# Patient Record
Sex: FEMALE | Race: WHITE | NOT HISPANIC OR LATINO | Employment: FULL TIME | URBAN - METROPOLITAN AREA
[De-identification: names, ages, dates, MRNs, and addresses within clinical notes are randomized per-mention and may not be internally consistent; named-entity substitution may affect disease eponyms.]

---

## 2021-11-05 DIAGNOSIS — M25.572 LEFT ANKLE PAIN, UNSPECIFIED CHRONICITY: Primary | ICD-10-CM

## 2021-11-09 ENCOUNTER — HOSPITAL ENCOUNTER (OUTPATIENT)
Dept: RADIOLOGY | Facility: HOSPITAL | Age: 36
Discharge: HOME | End: 2021-11-09
Attending: ORTHOPAEDIC SURGERY
Payer: COMMERCIAL

## 2021-11-09 ENCOUNTER — OFFICE VISIT (OUTPATIENT)
Dept: ORTHOPEDICS | Facility: CLINIC | Age: 36
End: 2021-11-09
Payer: COMMERCIAL

## 2021-11-09 DIAGNOSIS — M21.6X2 ACQUIRED CAVOVARUS DEFORMITY OF FOOT, LEFT: ICD-10-CM

## 2021-11-09 DIAGNOSIS — M25.572 LEFT ANKLE PAIN, UNSPECIFIED CHRONICITY: ICD-10-CM

## 2021-11-09 DIAGNOSIS — G60.0 CHARCOT-MARIE-TOOTH DISEASE: ICD-10-CM

## 2021-11-09 DIAGNOSIS — G60.0 CHARCOT-MARIE-TOOTH DISEASE: Primary | ICD-10-CM

## 2021-11-09 PROCEDURE — 73630 XR FOOT COMPLETE 3 VIEW LEFT: ICD-10-PCS | Mod: 26,LT,, | Performed by: RADIOLOGY

## 2021-11-09 PROCEDURE — 73630 X-RAY EXAM OF FOOT: CPT | Mod: 26,LT,, | Performed by: RADIOLOGY

## 2021-11-09 PROCEDURE — 73630 X-RAY EXAM OF FOOT: CPT | Mod: TC,PO,LT

## 2021-11-09 PROCEDURE — 99204 PR OFFICE/OUTPT VISIT, NEW, LEVL IV, 45-59 MIN: ICD-10-PCS | Mod: S$GLB,,, | Performed by: ORTHOPAEDIC SURGERY

## 2021-11-09 PROCEDURE — 99999 PR PBB SHADOW E&M-EST. PATIENT-LVL II: ICD-10-PCS | Mod: PBBFAC,,, | Performed by: ORTHOPAEDIC SURGERY

## 2021-11-09 PROCEDURE — 99999 PR PBB SHADOW E&M-EST. PATIENT-LVL II: CPT | Mod: PBBFAC,,, | Performed by: ORTHOPAEDIC SURGERY

## 2021-11-09 PROCEDURE — 73610 XR ANKLE COMPLETE 3 VIEW LEFT: ICD-10-PCS | Mod: 26,LT,, | Performed by: RADIOLOGY

## 2021-11-09 PROCEDURE — 73610 X-RAY EXAM OF ANKLE: CPT | Mod: TC,PO,LT

## 2021-11-09 PROCEDURE — 73610 X-RAY EXAM OF ANKLE: CPT | Mod: 26,LT,, | Performed by: RADIOLOGY

## 2021-11-09 PROCEDURE — 99204 OFFICE O/P NEW MOD 45 MIN: CPT | Mod: S$GLB,,, | Performed by: ORTHOPAEDIC SURGERY

## 2021-11-09 RX ORDER — ALPRAZOLAM 1 MG/1
TABLET ORAL
COMMUNITY
End: 2022-04-29

## 2021-11-09 RX ORDER — METHYLPREDNISOLONE 4 MG/1
TABLET ORAL
COMMUNITY
End: 2022-04-29

## 2021-11-09 RX ORDER — ZOLPIDEM TARTRATE 10 MG/1
TABLET ORAL
COMMUNITY

## 2021-11-09 RX ORDER — AMITRIPTYLINE HYDROCHLORIDE 100 MG/1
TABLET ORAL
COMMUNITY
End: 2022-04-29

## 2021-11-09 RX ORDER — ALBUTEROL SULFATE 90 UG/1
AEROSOL, METERED RESPIRATORY (INHALATION)
COMMUNITY
End: 2022-04-29

## 2021-11-09 RX ORDER — ATOMOXETINE 40 MG/1
CAPSULE ORAL
COMMUNITY
End: 2022-04-29

## 2021-11-09 RX ORDER — AMOXICILLIN 875 MG/1
875 TABLET, FILM COATED ORAL 2 TIMES DAILY
COMMUNITY
Start: 2021-09-22 | End: 2022-04-29

## 2021-11-09 RX ORDER — AMITRIPTYLINE HYDROCHLORIDE 50 MG/1
TABLET, FILM COATED ORAL
COMMUNITY
End: 2022-04-29

## 2021-11-09 RX ORDER — AZITHROMYCIN 250 MG/1
TABLET, FILM COATED ORAL
COMMUNITY
End: 2022-04-29

## 2021-11-09 RX ORDER — AMITRIPTYLINE HYDROCHLORIDE 25 MG/1
TABLET, FILM COATED ORAL
COMMUNITY
End: 2022-04-29

## 2021-11-09 RX ORDER — TRAZODONE HYDROCHLORIDE 100 MG/1
TABLET ORAL
COMMUNITY

## 2021-11-09 RX ORDER — AZITHROMYCIN 500 MG/1
TABLET, FILM COATED ORAL
COMMUNITY
End: 2022-04-29

## 2021-12-09 DIAGNOSIS — G60.0 CHARCOT-MARIE-TOOTH DISEASE: Primary | ICD-10-CM

## 2021-12-09 DIAGNOSIS — M21.6X2 ACQUIRED CAVOVARUS DEFORMITY OF FOOT, LEFT: ICD-10-CM

## 2021-12-10 ENCOUNTER — PATIENT MESSAGE (OUTPATIENT)
Dept: ORTHOPEDICS | Facility: CLINIC | Age: 36
End: 2021-12-10
Payer: COMMERCIAL

## 2022-01-11 ENCOUNTER — PATIENT MESSAGE (OUTPATIENT)
Dept: ORTHOPEDICS | Facility: CLINIC | Age: 37
End: 2022-01-11
Payer: COMMERCIAL

## 2022-04-29 ENCOUNTER — OFFICE VISIT (OUTPATIENT)
Dept: ORTHOPEDICS | Facility: CLINIC | Age: 37
End: 2022-04-29
Payer: COMMERCIAL

## 2022-04-29 DIAGNOSIS — M21.6X2 ACQUIRED CAVOVARUS DEFORMITY OF FOOT, LEFT: ICD-10-CM

## 2022-04-29 DIAGNOSIS — G60.0 CHARCOT-MARIE-TOOTH DISEASE: Primary | ICD-10-CM

## 2022-04-29 DIAGNOSIS — M21.6X1 ACQUIRED CAVOVARUS DEFORMITY OF RIGHT FOOT: ICD-10-CM

## 2022-04-29 DIAGNOSIS — M67.02 CONTRACTURE OF LEFT ACHILLES TENDON: ICD-10-CM

## 2022-04-29 PROCEDURE — 20605 DRAIN/INJ JOINT/BURSA W/O US: CPT | Mod: LT,S$GLB,, | Performed by: ORTHOPAEDIC SURGERY

## 2022-04-29 PROCEDURE — 99214 OFFICE O/P EST MOD 30 MIN: CPT | Mod: 25,S$GLB,, | Performed by: ORTHOPAEDIC SURGERY

## 2022-04-29 PROCEDURE — 99999 PR PBB SHADOW E&M-EST. PATIENT-LVL III: CPT | Mod: PBBFAC,,, | Performed by: ORTHOPAEDIC SURGERY

## 2022-04-29 PROCEDURE — 99214 PR OFFICE/OUTPT VISIT, EST, LEVL IV, 30-39 MIN: ICD-10-PCS | Mod: 25,S$GLB,, | Performed by: ORTHOPAEDIC SURGERY

## 2022-04-29 PROCEDURE — 1159F MED LIST DOCD IN RCRD: CPT | Mod: CPTII,S$GLB,, | Performed by: ORTHOPAEDIC SURGERY

## 2022-04-29 PROCEDURE — 1159F PR MEDICATION LIST DOCUMENTED IN MEDICAL RECORD: ICD-10-PCS | Mod: CPTII,S$GLB,, | Performed by: ORTHOPAEDIC SURGERY

## 2022-04-29 PROCEDURE — 99999 PR PBB SHADOW E&M-EST. PATIENT-LVL III: ICD-10-PCS | Mod: PBBFAC,,, | Performed by: ORTHOPAEDIC SURGERY

## 2022-04-29 PROCEDURE — 20605 INTERMEDIATE JOINT ASPIRATION/INJECTION: L ANKLE: ICD-10-PCS | Mod: LT,S$GLB,, | Performed by: ORTHOPAEDIC SURGERY

## 2022-04-29 RX ORDER — DIAZEPAM 2 MG/1
TABLET ORAL
COMMUNITY
Start: 2021-07-22

## 2022-04-29 RX ORDER — HYDROXYZINE HYDROCHLORIDE 10 MG/1
10 TABLET, FILM COATED ORAL 2 TIMES DAILY
COMMUNITY
Start: 2021-11-05 | End: 2022-12-06

## 2022-04-29 RX ORDER — ESCITALOPRAM OXALATE 20 MG/1
20 TABLET ORAL DAILY
COMMUNITY
Start: 2022-02-07

## 2022-04-29 RX ORDER — PROGESTERONE 200 MG/1
CAPSULE ORAL
COMMUNITY
Start: 2022-02-11

## 2022-04-29 RX ORDER — DIAZEPAM 5 MG/1
TABLET ORAL
COMMUNITY
End: 2022-12-06

## 2022-04-29 RX ORDER — CLONAZEPAM 0.5 MG/1
TABLET ORAL
COMMUNITY
End: 2022-12-06

## 2022-04-29 RX ORDER — PHENYLEPHRINE HCL 10 MG
TABLET ORAL
COMMUNITY
End: 2022-12-06

## 2022-04-29 RX ORDER — FLUCONAZOLE 150 MG/1
150 TABLET ORAL
COMMUNITY
Start: 2022-04-07 | End: 2022-12-06

## 2022-04-29 RX ORDER — DAPAGLIFLOZIN AND METFORMIN HYDROCHLORIDE 5; 500 MG/1; MG/1
TABLET, FILM COATED, EXTENDED RELEASE ORAL
COMMUNITY
End: 2022-12-06

## 2022-04-29 RX ORDER — IBUPROFEN 800 MG/1
TABLET ORAL
COMMUNITY

## 2022-04-29 RX ORDER — DULOXETIN HYDROCHLORIDE 20 MG/1
20 CAPSULE, DELAYED RELEASE ORAL DAILY
COMMUNITY
Start: 2022-04-25 | End: 2022-12-06

## 2022-04-29 RX ORDER — SITAGLIPTIN 100 MG/1
100 TABLET, FILM COATED ORAL DAILY
COMMUNITY
Start: 2022-04-19 | End: 2022-12-06

## 2022-04-29 RX ADMIN — TRIAMCINOLONE ACETONIDE 40 MG: 40 INJECTION, SUSPENSION INTRA-ARTICULAR; INTRAMUSCULAR at 11:04

## 2022-04-29 NOTE — PROGRESS NOTES
Subjective:   Chief complaint: Follow-up bilat foot.    HPI:   Kayla Aguilar is a 36 y.o. female who presents today for follow-up.  Pain is 6/10.  Left >>> right.  2 reasons for visit today - 1.) left ankle pain; wonders about injection and 2.) discuss different brace options    She cancelled surgery due to plans to get pregnant.      ROS:  Musculoskeletal: per HPI     Objective:   Exam:  There were no vitals filed for this visit.  General: No acute distress, well-appearing  Musculoskeletal: Standing examination demonstrates plantigrade and full correction on the right and slight residual equinovarus on left but foot still able to contact ground.  Left achilles contracture present.    Imaging:  None    Additional testing/results reviewed:  None      Assessment:     1. Charcot-Chary-Tooth disease    2. Acquired cavovarus deformity of foot, left    3. Acquired cavovarus deformity of right foot    4. Contracture of left Achilles tendon        Patient is seen for multiple problems (not at treatment goal) with treatment complicated by charcot chary tooth disease; neurologic component of this.    Data: none    Treatment plan: Corticosteroid injection management discussed and provided    I again reviewed imaging, clinical history, and diagnosis as above with the patient. I attempted to use layman's terms to educate the patient as well as utilize foot models and/or pictures.   At this point, attempted non-operative treatment is providing only partial relief.  The next step is 1.) new braces and 2.) CSI.      Plan:       1.  Therapy: None but emphasized importance of maintaining THEODORE flexibility  2.  Symptomatic treatment: left ankle CSI provided today  3.  Restrictions: Advance activity as tolerated, use pain as guide  4.  Brace/orthotics/etc: Solid ankle AFOs, consideration for posterior vs medial strut (left specifically) only  5.  Follow-up: 2 weeks with phone f/up to discuss response to CSI        No orders of the  defined types were placed in this encounter.      Past Medical History:   Diagnosis Date    CMT (cervical motion tenderness)     Diabetes mellitus, type 2        Past Surgical History:   Procedure Laterality Date    ANKLE SURGERY Left     OVARY SURGERY      TONSILLECTOMY         History reviewed. No pertinent family history.    Social History     Socioeconomic History    Marital status:

## 2022-04-29 NOTE — LETTER
[facility] - Orthopedics  [address]  [facility] [address]  Phone: [phone]  Fax: [phone]       Kayla Aguilar  1985  [address] [place] [address]  [place] [address]  [phone]     04/29/2022    [facility], [address] [address]; [phone] (phone) and [phone] (fax),    I am referring you my patient Kayla Aguilar for evaluation and fitting of Off-the-shelf - molded equipment as detailed below.    Diagnosis:     ICD-10-CM ICD-9-CM   1. Charcot-Chary-Tooth disease  G60.0 356.1   2. Acquired cavovarus deformity of foot, left  M21.6X2 736.75   3. Acquired cavovarus deformity of right foot  M21.6X1 736.75        Rx: Carbon fiber AFOs with left medial side post  Orthotist discretion: Yes    Sincerely,      Steff Buck MD  Foot & Ankle Orthopedic Surgery  [phone]

## 2022-05-01 RX ORDER — TRIAMCINOLONE ACETONIDE 40 MG/ML
40 INJECTION, SUSPENSION INTRA-ARTICULAR; INTRAMUSCULAR
Status: DISCONTINUED | OUTPATIENT
Start: 2022-04-29 | End: 2022-05-01 | Stop reason: HOSPADM

## 2022-05-01 NOTE — PROCEDURES
Intermediate Joint Aspiration/Injection: L ankle    Date/Time: 4/29/2022 11:15 AM  Performed by: Steff Buck MD  Authorized by: Steff Buck MD     Consent Done?:  Yes (Verbal)  Indications:  Pain  Site marked: The procedure site was marked    Timeout: Prior to procedure the correct patient, procedure, and site was verified      Location:  Ankle  Site:  L ankle  Needle size:  22 G  Approach:  Anteromedial  Medications:  40 mg triamcinolone acetonide 40 mg/mL  Patient tolerance:  Patient tolerated the procedure well with no immediate complications

## 2022-05-04 ENCOUNTER — PATIENT MESSAGE (OUTPATIENT)
Dept: ORTHOPEDICS | Facility: CLINIC | Age: 37
End: 2022-05-04
Payer: COMMERCIAL

## 2022-05-17 ENCOUNTER — TELEPHONE (OUTPATIENT)
Dept: ORTHOPEDICS | Facility: CLINIC | Age: 37
End: 2022-05-17
Payer: COMMERCIAL

## 2022-05-17 NOTE — TELEPHONE ENCOUNTER
M for pt to give me a call back.----- Message from Des Bueno MA sent at 5/16/2022  2:09 PM CDT -----  Regarding: FW: Injection response?    ----- Message -----  From: Steff Buck MD  Sent: 5/15/2022  12:00 AM CDT  To: [facility] Staff  Subject: Injection response?                              Please call patient to check in on response/relief from injection.    Thanks!    Beronica

## 2022-05-18 ENCOUNTER — PATIENT MESSAGE (OUTPATIENT)
Dept: ORTHOPEDICS | Facility: CLINIC | Age: 37
End: 2022-05-18
Payer: COMMERCIAL

## 2022-08-09 ENCOUNTER — PATIENT MESSAGE (OUTPATIENT)
Dept: ORTHOPEDICS | Facility: CLINIC | Age: 37
End: 2022-08-09
Payer: COMMERCIAL

## 2022-10-03 DIAGNOSIS — M21.6X2 ACQUIRED CAVOVARUS DEFORMITY OF FOOT, LEFT: Primary | ICD-10-CM

## 2022-11-29 ENCOUNTER — PATIENT MESSAGE (OUTPATIENT)
Dept: ORTHOPEDICS | Facility: CLINIC | Age: 37
End: 2022-11-29
Payer: COMMERCIAL

## 2022-12-02 DIAGNOSIS — M21.6X2 ACQUIRED CAVOVARUS DEFORMITY OF FOOT, LEFT: Primary | ICD-10-CM

## 2022-12-05 ENCOUNTER — TELEPHONE (OUTPATIENT)
Dept: ORTHOPEDICS | Facility: CLINIC | Age: 37
End: 2022-12-05
Payer: COMMERCIAL

## 2022-12-05 NOTE — TELEPHONE ENCOUNTER
Spoke to pt and informed her on my end I dont see a co pay----- Message from Rachell Arcos sent at 12/5/2022 10:51 AM CST -----  Contact: pt  Pt calling to see if she can get more time to pay off her co pay , pt has appt on  tomorrow     Confirmed patient's contact info below:  Contact Name: Kayla Aguilar  Phone Number: [phone]

## 2022-12-06 ENCOUNTER — HOSPITAL ENCOUNTER (OUTPATIENT)
Dept: RADIOLOGY | Facility: HOSPITAL | Age: 37
Discharge: HOME | End: 2022-12-06
Attending: ORTHOPAEDIC SURGERY
Payer: COMMERCIAL

## 2022-12-06 ENCOUNTER — OFFICE VISIT (OUTPATIENT)
Dept: ORTHOPEDICS | Facility: CLINIC | Age: 37
End: 2022-12-06
Payer: COMMERCIAL

## 2022-12-06 DIAGNOSIS — G60.0 CHARCOT-MARIE-TOOTH DISEASE: ICD-10-CM

## 2022-12-06 DIAGNOSIS — M21.6X2 ACQUIRED CAVOVARUS DEFORMITY OF FOOT, LEFT: Primary | ICD-10-CM

## 2022-12-06 DIAGNOSIS — M21.6X2 ACQUIRED CAVOVARUS DEFORMITY OF FOOT, LEFT: ICD-10-CM

## 2022-12-06 PROCEDURE — 20605 DRAIN/INJ JOINT/BURSA W/O US: CPT | Mod: LT,S$GLB,, | Performed by: ORTHOPAEDIC SURGERY

## 2022-12-06 PROCEDURE — 99999 PR PBB SHADOW E&M-EST. PATIENT-LVL III: ICD-10-PCS | Mod: PBBFAC,,, | Performed by: ORTHOPAEDIC SURGERY

## 2022-12-06 PROCEDURE — 73630 X-RAY EXAM OF FOOT: CPT | Mod: 26,LT,, | Performed by: RADIOLOGY

## 2022-12-06 PROCEDURE — 20605 INTERMEDIATE JOINT ASPIRATION/INJECTION: L ANKLE: ICD-10-PCS | Mod: LT,S$GLB,, | Performed by: ORTHOPAEDIC SURGERY

## 2022-12-06 PROCEDURE — 99214 PR OFFICE/OUTPT VISIT, EST, LEVL IV, 30-39 MIN: ICD-10-PCS | Mod: 25,S$GLB,, | Performed by: ORTHOPAEDIC SURGERY

## 2022-12-06 PROCEDURE — 99999 PR PBB SHADOW E&M-EST. PATIENT-LVL III: CPT | Mod: PBBFAC,,, | Performed by: ORTHOPAEDIC SURGERY

## 2022-12-06 PROCEDURE — 73630 XR FOOT COMPLETE 3 VIEW LEFT: ICD-10-PCS | Mod: 26,LT,, | Performed by: RADIOLOGY

## 2022-12-06 PROCEDURE — 1159F PR MEDICATION LIST DOCUMENTED IN MEDICAL RECORD: ICD-10-PCS | Mod: CPTII,S$GLB,, | Performed by: ORTHOPAEDIC SURGERY

## 2022-12-06 PROCEDURE — 73630 X-RAY EXAM OF FOOT: CPT | Mod: TC,PO,LT

## 2022-12-06 PROCEDURE — 1159F MED LIST DOCD IN RCRD: CPT | Mod: CPTII,S$GLB,, | Performed by: ORTHOPAEDIC SURGERY

## 2022-12-06 PROCEDURE — 99214 OFFICE O/P EST MOD 30 MIN: CPT | Mod: 25,S$GLB,, | Performed by: ORTHOPAEDIC SURGERY

## 2022-12-06 RX ADMIN — TRIAMCINOLONE ACETONIDE 40 MG: 40 INJECTION, SUSPENSION INTRA-ARTICULAR; INTRAMUSCULAR at 01:12

## 2022-12-06 NOTE — PROCEDURES
Intermediate Joint Aspiration/Injection: L ankle    Date/Time: 12/6/2022 1:30 PM  Performed by: Steff Buck MD  Authorized by: Steff Buck MD     Consent Done?:  Yes (Verbal)  Indications:  Pain  Site marked: The procedure site was marked    Timeout: Prior to procedure the correct patient, procedure, and site was verified      Location:  Ankle  Site:  L ankle  Needle size:  22 G  Approach:  Anteromedial  Medications:  40 mg triamcinolone acetonide 40 mg/mL  Patient tolerance:  Patient tolerated the procedure well with no immediate complications

## 2022-12-06 NOTE — PROGRESS NOTES
Subjective:   Chief complaint: Follow-up left foot.    HPI:   Kayla Aguilar is a 36 y.o. female who presents today for follow-up.  Pain is 5/10.  Braces continue to work well on right but less well on left.  Increased lateral foot callus since last visit but noted good relief from CSI.  Presents for repeat CSI.  They still are working on plans to start family so goal remains to avoid surgery.     ROS:  Musculoskeletal: per HPI     Objective:   Exam:  There were no vitals filed for this visit.  General: No acute distress, well-appearing  Musculoskeletal: Left foot skin with callus without hemorrhage or breakdown.  Semi-rigid hindfoot varus with metatarsus adductus and equinus.    Imaging:  None    Additional testing/results reviewed:  Previous treatment- AFO braces, ankle CSI.            Assessment:     1. Acquired cavovarus deformity of foot, left    2. Charcot-Chary-Tooth disease         Patient is seen for a chronic problem with acute worsening with treatment complicated by underlying progressive neurologic condition (CMT) .    Data: none    Treatment plan: Corticosteroid injection management discussed and provided (see procedure notes)        Plan:       1.  Therapy: None  2.  Symptomatic treatment: No changes made  --CSI provided today (see separate note), can be repeated every 3-4 months  3.  Restrictions: Advance activity as tolerated, use pain as guide  4.  Brace/orthotics/etc: continue AFOs  5.  Follow-up: Followup via phone in 2 weeks to discuss response to CSI       Orders Placed This Encounter   Procedures    Intermediate Joint Aspiration/Injection: L ankle     This order was created via procedure documentation       Past Medical History:   Diagnosis Date    CMT (cervical motion tenderness)     Diabetes mellitus, type 2        Past Surgical History:   Procedure Laterality Date    ANKLE SURGERY Left     OVARY SURGERY      TONSILLECTOMY         No family history on file.    Social History      Socioeconomic History    Marital status:    Tobacco Use    Smoking status: Never    Smokeless tobacco: Never

## 2022-12-06 NOTE — PATIENT INSTRUCTIONS
You were provided a corticosteroid and anesthetic (numbing medicine) injection today. Injections are provided for both pain relief as well as to help further identify and treat causes of your pain.  I expect that the numbing medicine will last for several hours.  Once the numbing medicine wears off, do not be alarmed if you have some increased pain from the injection and the fluid in the joint. You can treat this with over the counter anti-inflammatories or acetaminophen.  It can take 3-5 days for the corticosteroid to reach maximal effect.  The duration of relief from an injection it is different for every patient. Injections can only be repeated up to 3-4 times per year however.    It is normal for some pain following injection, but call our office (or seek out evaluation with provider- ER, urgent care, etc) if you have any of the following concerning findings:  Redness and swelling at the injection site  Drainage from the injection site  Fever >100.4 associated with increased pain or the above at the injection site      You were also seen for left foot callus, to hep mitigate/treat these calluses below are options:  eucerin cream - to help keep the area moisturized   salicylic acid callus helps treat the callus once it is formed

## 2022-12-11 RX ORDER — TRIAMCINOLONE ACETONIDE 40 MG/ML
40 INJECTION, SUSPENSION INTRA-ARTICULAR; INTRAMUSCULAR
Status: DISCONTINUED | OUTPATIENT
Start: 2022-12-06 | End: 2022-12-11 | Stop reason: HOSPADM

## 2023-02-03 ENCOUNTER — PATIENT MESSAGE (OUTPATIENT)
Dept: ORTHOPEDICS | Facility: CLINIC | Age: 38
End: 2023-02-03
Payer: COMMERCIAL

## 2023-05-03 ENCOUNTER — PATIENT MESSAGE (OUTPATIENT)
Dept: ORTHOPEDICS | Facility: CLINIC | Age: 38
End: 2023-05-03
Payer: COMMERCIAL

## 2024-05-14 ENCOUNTER — OFFICE VISIT (OUTPATIENT)
Dept: MATERNAL FETAL MEDICINE | Facility: CLINIC | Age: 39
End: 2024-05-14
Payer: COMMERCIAL

## 2024-05-14 VITALS
BODY MASS INDEX: 39.27 KG/M2 | SYSTOLIC BLOOD PRESSURE: 137 MMHG | HEIGHT: 64 IN | HEART RATE: 93 BPM | WEIGHT: 230 LBS | DIASTOLIC BLOOD PRESSURE: 87 MMHG

## 2024-05-14 DIAGNOSIS — F41.9 ANXIETY: ICD-10-CM

## 2024-05-14 DIAGNOSIS — E03.9 HYPOTHYROIDISM, UNSPECIFIED TYPE: Primary | ICD-10-CM

## 2024-05-14 DIAGNOSIS — E66.9 OBESITY (BMI 30-39.9): ICD-10-CM

## 2024-05-14 DIAGNOSIS — E11.9 TYPE 2 DIABETES MELLITUS WITHOUT COMPLICATION, UNSPECIFIED WHETHER LONG TERM INSULIN USE: ICD-10-CM

## 2024-05-14 DIAGNOSIS — G60.0 CHARCOT MARIE TOOTH MUSCULAR ATROPHY: ICD-10-CM

## 2024-05-14 DIAGNOSIS — R79.89 LOW SERUM CORTISOL LEVEL: ICD-10-CM

## 2024-05-14 DIAGNOSIS — Z31.69 ENCOUNTER FOR PRECONCEPTION CONSULTATION: ICD-10-CM

## 2024-05-14 PROCEDURE — 99205 OFFICE O/P NEW HI 60 MIN: CPT | Mod: S$GLB,,, | Performed by: OBSTETRICS & GYNECOLOGY

## 2024-05-14 RX ORDER — PROMETHAZINE HYDROCHLORIDE 25 MG/1
TABLET ORAL EVERY 6 HOURS PRN
COMMUNITY
Start: 2024-05-13

## 2024-05-14 RX ORDER — LEVOTHYROXINE SODIUM 50 UG/1
25 TABLET ORAL
COMMUNITY

## 2024-05-14 RX ORDER — ELUXADOLINE 75 MG/1
1 TABLET, FILM COATED ORAL 2 TIMES DAILY
COMMUNITY
Start: 2024-05-10

## 2024-05-14 RX ORDER — FLUTICASONE PROPIONATE 50 MCG
2 SPRAY, SUSPENSION (ML) NASAL 2 TIMES DAILY PRN
COMMUNITY
Start: 2023-12-18

## 2024-05-14 RX ORDER — HUMAN INSULIN 100 [IU]/ML
INJECTION, SUSPENSION SUBCUTANEOUS
COMMUNITY
End: 2024-05-17 | Stop reason: ALTCHOICE

## 2024-05-14 RX ORDER — METFORMIN HYDROCHLORIDE EXTENDED-RELEASE TABLETS 1000 MG/1
500 TABLET, FILM COATED, EXTENDED RELEASE ORAL 2 TIMES DAILY WITH MEALS
COMMUNITY

## 2024-05-14 RX ORDER — ESCITALOPRAM OXALATE 10 MG/1
5 TABLET ORAL DAILY
COMMUNITY

## 2024-05-14 RX ORDER — INSULIN ASPART 100 [IU]/ML
INJECTION, SOLUTION INTRAVENOUS; SUBCUTANEOUS
COMMUNITY
End: 2024-05-16 | Stop reason: SDUPTHER

## 2024-05-15 NOTE — PROGRESS NOTES
ENDOCRINOLOGY NEW PATIENT VISIT: 05/16/2024    The patient location is: Home in [place]  The chief complaint leading to consultation is: Diabetes and Thyroid    Visit type: audiovisual    Face to Face time with patient: 30  50 minutes of total time spent on the encounter, which includes face to face time and non-face to face time preparing to see the patient (eg, review of tests), Obtaining and/or reviewing separately obtained history, Documenting clinical information in the electronic or other health record, Independently interpreting results (not separately reported) and communicating results to the patient/family/caregiver, or Care coordination (not separately reported).     Each patient to whom he or she provides medical services by telemedicine is:  (1) informed of the relationship between the physician and patient and the respective role of any other health care provider with respect to management of the patient; and (2) notified that he or she may decline to receive medical services by telemedicine and may withdraw from such care at any time.      Subjective:      Patient ID: Kayal Aguilar is a 38 y.o. female.    Chief Complaint:  Diabetes and Thyroid    History of Present Illness  Kayla Aguilar has a medical history of type 2 diabetes, Charcot foot, and current plans for pregnancy.  She is following with a fertility clinic and also maternal fetal medicine given her age and plans for pregnancy with underlying diabetes.      Referred by reproductive endocrinology (Dr. Antione Childs) due to plans for attempts at pregnancy and a history of diabetes.  Had seen endocrinologist in [place] in January but has not liked them and their follow up on labs and such.  She is now establishing with [facility].  She also recently changed her [facility] physician to [facility] as well.       Fertility plans.  Had plans for pregnancy attempts backed off so she could have her recent foot surgery completed (charcot florencia tooth).   She remains in a cast at this time.  Plans coming up for IUI cycle.  Likely in the next 1-2 months she estimates.      11/27/2023 Labs (in media)  AMH:  1.31  A1c: 7.2 %  (also more recent one in pat 1-2 months of 7.1%)  Est Avg Glucose: 161  Prolactin: 10.4  TSH: 4.510  FSH: 8.12    Following the above labs she had thyroid hormone started in December.  Has been on Levothyroxine 25 mcg since without a repeat TSH.  She had some labs checked with her T3 level being low she recalls.     Normal dexamethasone suppression test results performed with ACTH <5 and Cortisol of 0.97 (read results to me off her phone)      Regarding diabetes:    Initially Diagnosed with T2D:  2015    Current symptoms/problems:     Some symptoms of neuropathy (also with Charcot foot hx).  Concerns have been for IBS for which she has been following with GI and recently placed on Viberzi (helps her)  BM from 7 or more to 2-3 times a day. All GI symptoms present since being on metformin over the years without a trial of stopping therapy.      Known diabetic complications: peripheral neuropathy  Cardiovascular risk factors: diabetes mellitus, obesity (BMI >= 30 kg/m2), and sedentary lifestyle    Current diabetic medications include:    1.   Novolog 5-15 units (occasionally 20 units)  Based on meal and carb content.     2.   Metformin 500 mg BID (sometimes only taking once a day if sugar is lower)   3.   Novolin (NPH)  Gives 10 units of this when she wakes up and after lunch (twice a day mostly).      Other medications tried:  Lantus (told not pregnancy safe by outside MFM and fertility physician)  Ozempic in the past (off due to GI side effects and plans for pregnancy)  Trulicity   Xigduo (Metformin and dapagliflozin combo)      Diet: on average, 2 meals per day    Exercise:  VR workouts , mobility limited due to being in a cast.     Glucose Trends:  Monitors 4-5 times a day with finger sticks.  Attempts at CGM in past unsuccessful for coverage       "         Average AM fasting -  110-130's       Average Post Lunch -  120-140's        Average Post Dinner -          Any episodes of hypoglycemia? yes - intermittent, but not often.  Had recent drop to 67.      Family Hx:  Denies FH of diabetes or thyroid disorder             Wt Readings from Last 10 Encounters:   05/14/24 104.3 kg (230 lb)       Diabetes Management Status    Statin: Not taking  ACE/ARB: Not taking      Screening or Prevention Patient's value   HgA1C Testing and Control No results found for: "HGBA1C"   Lipid profile  Most Recent Lipid Panel Health Maintenance Topic Completion: Not Found   LDL control (goal LDL <70) No results found for: "LDLCALC"   Nephropathy screening No results found for: "LABMICR"  Lab Results   Component Value Date    PROTEINUA Negative 08/11/2022      Blood pressure BP Readings from Last 3 Encounters:   05/14/24 137/87      Dilated retinal exam Most Recent Eye Exam Date: Not Found   Foot exam Most Recent Foot Exam Date: Not Found        Regarding Hypothyroidism:    Had TSH levels at 4.5 (no prior issues with thyroid function)    Started on LT4 in setting of planned pregnancy and TSH above 2.5    - Current relevant medications: levothyroxine T4 (Synthroid) 25 mcg daily    - Takes thyroid medications properly  - Plans for pregnancy at this time: Yes, working with fertility clinic.    No results found for: "TSH", "FREET4", "TOTALT3", "THYROIDSTIMI", "THYROPEROXID", "NA", "GLU"  There were no vitals filed for this visit.     ROS:   As above    Objective:     Ht 5' 4" (1.626 m)   LMP 04/16/2024 (Exact Date)   BMI 39.48 kg/m²   BP Readings from Last 3 Encounters:   05/14/24 137/87     Wt Readings from Last 1 Encounters:   05/14/24 1321 104.3 kg (230 lb)     Body mass index is 39.48 kg/m².    Physical Exam  Constitutional:       General: She is not in acute distress.     Appearance: Normal appearance.   Pulmonary:      Effort: Pulmonary effort is normal.   Neurological:      " "Mental Status: She is alert.   Psychiatric:         Mood and Affect: Mood normal.         Behavior: Behavior normal.        Lab Review:   No results found for: "HGBA1C"  No results found for: "CHOL", "HDL", "LDLCALC", "TRIG", "CHOLHDL"  No results found for: "NA", "K", "CL", "CO2", "GLU", "BUN", "CREATININE", "CALCIUM", "PROT", "ALBUMIN", "BILITOT", "ALKPHOS", "AST", "ALT", "ANIONGAP", "ESTGFRAFRICA", "EGFRNONAA", "TSH"  No results found for: "ZHILZCFX75MG"    Assessment and Plan     Low serum cortisol level  Reviewed recent labs that were completed outside of [facility].  These included a dexamethasone suppression test which showed her 8 am morning cortisol and ACTH to both be low.  Explained that this is a normal result and is meant to screen for hypercortisolism.  Cutoff to rule out an issue was met with cortisol of 0.97 (cutoff of less than 1.8).      Requested she takes pictures or scans of outside labs and send to us for completeness so they can be reviewed.  At the moment no concerns for hypercortisolism and no symptoms to suggest adrenal insuffiencey, simply a normal lab response to the DST causing the cortisol levels to be low.      Hypothyroidism  Relative hypothyroidism with recent outside labs showing TSH values just above 4.5.  In setting of planned pregnancy she was placed on Levothyroxine 25 mcg once daily approx 5 months ago.  Due for repeat TSH at this time, labs to be done this week.      Will adjust dose as needed with goal for TSH to remain below 2.5    Type 2 diabetes mellitus without complication  Reviewed oral recall of blood glucose levels from finger sticks.  Morning fasting glucose levels have been above target and postprandial glucose levels have been elevated at times as well.  Not using CGM.  Recent changes to her home medicines with discontinuation of Lantus and NPH ordered in it's place.  Had been on GLP-1 RA in past but now given pregnancy plans off this and using Novolog with meals.  " Metformin at lower dose continues for now.      Plan:  - Will clarify with maternal fetal medicine if they have concerns about Lantus (insulin glargine) use - can potentially simplify regimen if not  - Continue NPH insulin, 10 units twice daily  - Continue Novolog 5-15 units based on current sliding regimen with meals (will need 8 units with meals plus sliding scale once CGM approved)  - Continue Metformin 500 mg BID (can consider stopping if GI feels bowel issues are related to this therapy)    - Will order Dexcom G7 sensors (confirmed phone is compatible - iPhone)   - Will give instructions for downloading Dexcom and Clarity apps and sharing info with clinic remotely  - Continue use of glucometer with at least morning fasting BG and 1-2 hour post-prandial BG values (4 x daily checks)  - Healthy diabetic diet and physical activity/exercise as tolerated given recent surgery and need for a cast  - If she becomes pregnant we will need even closer monitoring, for now plan 3-4 week virtual follow up to check on current insulin doses    Pregnancy BG goals  Fasting: <95  1 hour post prandial: <140  2 hour post prandial: <120      Return to clinic virtually 3-4 weeks for BG check and adjustments to regimen as needed.  Labs soon (A1c and TSH)      **Visit today included increased complexity associated with the care of the problems addressed and managing the longitudinal care of the patient due to the serious and/or complex managed problems**      Alcides Estrella

## 2024-05-16 ENCOUNTER — TELEPHONE (OUTPATIENT)
Dept: ENDOCRINOLOGY | Facility: CLINIC | Age: 39
End: 2024-05-16

## 2024-05-16 ENCOUNTER — OFFICE VISIT (OUTPATIENT)
Dept: ENDOCRINOLOGY | Facility: CLINIC | Age: 39
End: 2024-05-16
Payer: COMMERCIAL

## 2024-05-16 ENCOUNTER — PATIENT MESSAGE (OUTPATIENT)
Dept: ENDOCRINOLOGY | Facility: CLINIC | Age: 39
End: 2024-05-16

## 2024-05-16 VITALS — HEIGHT: 64 IN | BODY MASS INDEX: 39.48 KG/M2

## 2024-05-16 DIAGNOSIS — R79.89 LOW SERUM CORTISOL LEVEL: ICD-10-CM

## 2024-05-16 DIAGNOSIS — E03.9 HYPOTHYROIDISM, UNSPECIFIED TYPE: ICD-10-CM

## 2024-05-16 DIAGNOSIS — E11.9 TYPE 2 DIABETES MELLITUS WITHOUT COMPLICATION, UNSPECIFIED WHETHER LONG TERM INSULIN USE: ICD-10-CM

## 2024-05-16 PROBLEM — G60.0 CHARCOT MARIE TOOTH MUSCULAR ATROPHY: Status: ACTIVE | Noted: 2024-05-16

## 2024-05-16 PROBLEM — Z31.69 ENCOUNTER FOR PRECONCEPTION CONSULTATION: Status: ACTIVE | Noted: 2024-05-16

## 2024-05-16 PROBLEM — F41.9 ANXIETY: Status: ACTIVE | Noted: 2024-05-16

## 2024-05-16 PROBLEM — E66.9 OBESITY (BMI 30-39.9): Status: ACTIVE | Noted: 2024-05-16

## 2024-05-16 PROCEDURE — 99204 OFFICE O/P NEW MOD 45 MIN: CPT | Mod: 95,,, | Performed by: STUDENT IN AN ORGANIZED HEALTH CARE EDUCATION/TRAINING PROGRAM

## 2024-05-16 RX ORDER — INSULIN ASPART 100 [IU]/ML
8 INJECTION, SOLUTION INTRAVENOUS; SUBCUTANEOUS
Qty: 15 ML | Refills: 6 | Status: SHIPPED | OUTPATIENT
Start: 2024-05-16

## 2024-05-16 RX ORDER — BLOOD-GLUCOSE SENSOR
EACH MISCELLANEOUS
Qty: 3 EACH | Refills: 11 | Status: SHIPPED | OUTPATIENT
Start: 2024-05-16

## 2024-05-16 NOTE — ASSESSMENT & PLAN NOTE
The patient was counseled regarding the risks of diabetes in pregnancy. These risks include but are not limited to an increased risk of , preeclampsia/gestational hypertension, fetal structural anomalies, macrosomia, prematurity, shoulder dystocia/birth injury,  hyperbilirubinemia and electrolyte issues, pulmonary immaturity and sudden stillbirth especially in those patients with poor glucose control. I also discussed with the patient the need for increased fetal surveillance with serial fetal growth assessments and third trimester fetal testing. I also reviewed the possibility of need for early delivery in those with poor glucose control or evidence of fetal growth abnormalities.  She does not have a blood sugar log with her today but states that her blood sugars are well controlled.  She is currently on metformin and NovoLog insulin with meals.  She believes the metformin is causing GI upset and contributing to her needing another medication.  I support her getting off metformin and increasing NovoLog.  She may need the additional long-acting insulin which is perfectly appropriate for pregnancy.  Our goal blood sugars for pregnancy would be fastings under 95 and postprandial blood sugars under 120 after 2 hours.  I discussed with her that the better her A1c is the lower her risk of both maternal and fetal complications.    Recommendations:  Maternal ophthalmic exam (if hasn't been performed in last year) and podiatry exam  Diabetic education referral and counseling if needed re: goal blood sugars (< 95 mg/dl for fasting, < 120 mg/dl for 2 hour postprandial)  Medications:   Start low dose aspirin 81 mg daily at 12-16 weeks for preeclampsia risk reduction (will be initiated in pregnancy)  1 mg folic acid daily  Regimen: Please consider DC metformin per patient preference and increase insulin dosing. We will not be titrating her insulin as she has a PCP and is not pregnancy.   She will submit her log  to our office on a weekly basis via email or portal for review and management during pregnancy  Ultrasounds with MFM:  Nuchal translucency scan at 12-13 weeks  Targeted anatomy survey at 20 weeks  Serial growth ultrasounds q 4-6 weeks at 26-28 weeks     A fetal echocardiogram is recommended at 22-24 weeks with pediatric cardiology     Initiate  surveillance at 32 weeks:   Weekly BPP or NST + AFV if good control.   If control is poor, twice weekly  fetal surveillance is recommended with BPP alternating with NST   Delivery timin 0/7 to 38 and 6/7 weeks if under good control without comorbidities  37 0/7 to 37 and 67 weeks if longstanding diabetes or poorly controlled, polyhydramnios, EFW>90th percentile, or BMI >= 40  36 0/7 to 37 and 6/7 weeks if vascular complications, prior stillbirth or other complicating conditions.  Recommend consideration of earlier delivery if IUGR, HTN, or other complications  Recommend offering  for delivery is EFW is 4500g or more near the time of delivery

## 2024-05-16 NOTE — ASSESSMENT & PLAN NOTE
Preconception Recommendations  The goal of a preconception counseling visit is to address conditions, both those previously recognized as well as unrecognized, that may affect a future pregnancy. For some conditions, interventions are possible that can improve future pregnancy outcome, while for other conditions, recognition allows a better assessment of pregnancy-associated risk.  In addition to these discussions and evaluations, you can improve your chances of a successful pregnancy outcome by implementing other practices into your daily routine. Because many women do not realize they are pregnant until several weeks after conception, it is best to make as many of the changes as possible when attempting pregnancy.     Begin folic acid supplementation, at least 0.4 mg per day, at least 1 month prior to attempting pregnancy and continue through the first trimester. Most prenatal vitamins contain 1 mg and therefore should suffice. Folic acid reduces the risk of neural tube defects (spina bifida) by 60-75%.  A regular exercise program, consisting of at least 30 minutes of activity at least 3 to 5 times a week will improve your overall cardiovascular conditioning and may lower your risk of gestational diabetes and hypertension.  Work to achieve a body weight that is within the normal range. Women who are obese as well as those who are extremely thin are at increased risk for pregnancy complications including fetal growth abnormalities and  delivery.  Avoid extreme diet practices. Diets that restrict intake of certain food groups often do not provide balanced nutrition.   Certain types of fish may be contaminated with mercury, a substance that may harm a developing fetus. Shark, swordfish, jeaneth mackerel, and tilefish have high levels of mercury and should be avoided. You may consume up to 6 ounces of fresh tuna or canned (albacore/white or light) tuna each week. The safety of locally-caught fish (in rivers or  lakes) varies; local precautions should be followed and no more than 6 ounces of this type of fish should be consumed in a week. Women who are pregnant or who may become pregnant can safely eat up to 12 ounces of other types of fish each week. (Based on EPA & FDA March, 2004 recommendations)  Prevent HIV infection--use safe sex practices if any of your activities could put you at risk for acquiring HIV or other sexually transmitted diseases  Avoid alcohol, tobacco, and illicit drugs before and during pregnancy. Tobacco lowers the rate of fertilization and can cause placental dysfunction. Alcohol is the most common environmental cause of mental retardation in the [place] Although there is no reason for concern if you have a couple of drinks before you realize you are pregnant, there is no clear answer regarding how much alcohol is safe during pregnancy. Therefore, it is best to avoid alcohol during pregnancy.  Optimize your overall health by maintaining good control of any medical conditions you have such as hypertension, asthma, diabetes, thyroid disease.  Once you are pregnant, seek early prenatal care to allow for accurate pregnancy dating and confirmation of a normal ongoing gestation

## 2024-05-16 NOTE — Clinical Note
Can you please set up labs tomorrow morning in [place], [place] for this patient at [facility] lab there. First available morning lab visit should be fine (8am etc.).   I believe there is an [facility] lab in town for her to go to.  Let me know if any issues with that.  Thanks.

## 2024-05-16 NOTE — ASSESSMENT & PLAN NOTE
She has a history of CMT. She endorses leg pains and recently had surgery due to this condition.   Overall this condition should not be affected by pregnancy.

## 2024-05-16 NOTE — PROGRESS NOTES
MATERNAL-FETAL MEDICINE   CONSULT NOTE    Provider requesting consultation: Dr. Childs    SUBJECTIVE:     Ms. Kayla Aguilar is a 38 y.o.  female who is seen in consultation by MFM for evaluation and management of:  Problem   Type 2 Diabetes Mellitus Without Complication   Hypothyroidism   Encounter for Preconception Consultation   Charcot Chary Tooth Muscular Atrophy   Obesity (Bmi 30-39.9)   Anxiety       She arrives today consultation due to multiple medical issues.  First, she is a type 2 diabetic and was diagnosed in .  Her most recent A1c was 2023 and was 7.2%.  She denies any admissions to the hospital for diabetes and does not have any known diabetic associated conditions (no kidney disease, no retinopathy, no neuropathy).  States she takes her sugars 2-3 times every day and her ranges between  she is currently taking metformin and NovoLog.  She states that she does not do a true carb count but takes 10-20 units on NovoLog with meals depending on how much she is eating.  She feels like the metformin is making her sick causing diarrhea she has not come off the medication because her general practitioner (Dr. Sandra) desire OB input as she is trying to achieve pregnancy.  She has started to see a GI doctor in [place] placed her on Viberzi for IBS with diarrhea.  This medication helps but ultimately she would like to stop her metformin so that she can see if she can do without.    She also has a history of Charcot-Chary-Tooth most significantly with leg and foot pain.  She has been using braces in the past and has had surgeries to deal with these issues.  She understands that this is a genetic illness and has a risk of fetal inheritance.   She is AMA and understands the risk of increased genetic disorders.    She has a long history of infertility and has been tested for antiphospholipid antibody syndrome.  She was previously referred to an endocrinologist from   [facility] and states that she has had abnormal cortisol levels, and hypothyroidism.  She is requesting endocrinology referral as she was unsatisfied with her previous consultation.    She has a history of anxiety and depression.  She is currently taking Lexapro, Valium as needed.       Medication List with Changes/Refills   New Medications    BLOOD-GLUCOSE SENSOR (DEXCOM G7 SENSOR) SHELLY    Apply one sensor to the skin every 10 days   Current Medications    DIAZEPAM (VALIUM) 2 MG TABLET    diazepam 2 mg tablet    ESCITALOPRAM OXALATE (LEXAPRO) 10 MG TABLET    Take 5 mg by mouth once daily.    FLUTICASONE PROPIONATE (FLONASE) 50 MCG/ACTUATION NASAL SPRAY    2 sprays by Nasal route 2 (two) times daily as needed.    IBUPROFEN (ADVIL,MOTRIN) 800 MG TABLET    ibuprofen 800 mg tablet    LEVOTHYROXINE (SYNTHROID) 50 MCG TABLET    Take 25 mcg by mouth before breakfast.    METFORMIN (FORTAMET) 1,000 MG 24HR TABLET    Take 500 mg by mouth 2 (two) times daily with meals.    NOVOLIN N FLEXPEN 100 UNIT/ML (3 ML) INPN    Inject into the skin.    PROMETHAZINE (PHENERGAN) 25 MG TABLET    Take by mouth every 6 (six) hours as needed.    VIBERZI 75 MG TAB    Take 1 tablet by mouth 2 (two) times daily.    ZOLPIDEM (AMBIEN) 10 MG TAB    Ambien Take No date recorded No form recorded No frequency recorded No route recorded No set duration recorded No set duration amount recorded active No dosage strength recorded No dosage strength units of measure recorded   Changed and/or Refilled Medications    Modified Medication Previous Medication    NOVOLOG FLEXPEN U-100 INSULIN 100 UNIT/ML (3 ML) INPN PEN NOVOLOG FLEXPEN U-100 INSULIN 100 unit/mL (3 mL) InPn pen       Inject 8 Units into the skin 3 (three) times daily with meals. Plus sliding scale.  Total daily dose up to 50 units.    Inject into the skin.   Discontinued Medications    ESCITALOPRAM OXALATE (LEXAPRO) 20 MG TABLET    Take 20 mg by mouth once daily.    INSULIN GLARGINE,HUM.REC.ANLOG  "(BASAGLAR KWIKPEN U-100 INSULIN SUBQ)    Inject into the skin.    PROGESTERONE (PROMETRIUM) 200 MG CAPSULE    Take by mouth.    TRAZODONE (DESYREL) 100 MG TABLET    trazodone Take No date recorded No form recorded No frequency recorded No route recorded No set duration recorded No set duration amount recorded active No dosage strength recorded No dosage strength units of measure recorded       Review of patient's allergies indicates:  No Known Allergies    PMH:  Past Medical History:   Diagnosis Date    Diabetes mellitus, type 2        PObHx:  OB History    Para Term  AB Living   0 0 0 0 0 0   SAB IAB Ectopic Multiple Live Births   0 0 0 0 0       PSH:  Past Surgical History:   Procedure Laterality Date    ANKLE SURGERY Left     OVARY SURGERY      TONSILLECTOMY         Family history:family history is not on file.    Social history: reports that she has never smoked. She has never used smokeless tobacco. She reports current alcohol use. She reports that she does not currently use drugs after having used the following drugs: Marijuana.    Genetic history: The patient denies any inherited genetic diseases or birth defects in herself or her partner's personal history or family.    Objective:   /87 (BP Location: Right arm)   Pulse 93   Ht 5' 4" (1.626 m)   Wt 104.3 kg (230 lb)   LMP 2024 (Exact Date)   BMI 39.48 kg/m²     Physical Exam  Constitutional:       General: She is not in acute distress.     Appearance: Normal appearance. She is not toxic-appearing.   HENT:      Head: Normocephalic and atraumatic.      Nose: Nose normal.   Eyes:      General: No scleral icterus.        Right eye: No discharge.         Left eye: No discharge.   Cardiovascular:      Pulses: Normal pulses.      Heart sounds: Normal heart sounds.   Pulmonary:      Effort: Pulmonary effort is normal.      Breath sounds: Normal breath sounds.   Skin:     Coloration: Skin is not jaundiced.      Findings: No bruising or " erythema.   Neurological:      General: No focal deficit present.      Mental Status: She is alert and oriented to person, place, and time. Mental status is at baseline.           Significant labs/imaging:  A1C 7.2, 7.1 per patient    ASSESSMENT/PLAN:     38 y.o.  here for preconception consultation.     Encounter for preconception consultation  Preconception Recommendations  The goal of a preconception counseling visit is to address conditions, both those previously recognized as well as unrecognized, that may affect a future pregnancy. For some conditions, interventions are possible that can improve future pregnancy outcome, while for other conditions, recognition allows a better assessment of pregnancy-associated risk.  In addition to these discussions and evaluations, you can improve your chances of a successful pregnancy outcome by implementing other practices into your daily routine. Because many women do not realize they are pregnant until several weeks after conception, it is best to make as many of the changes as possible when attempting pregnancy.     Begin folic acid supplementation, at least 0.4 mg per day, at least 1 month prior to attempting pregnancy and continue through the first trimester. Most prenatal vitamins contain 1 mg and therefore should suffice. Folic acid reduces the risk of neural tube defects (spina bifida) by 60-75%.  A regular exercise program, consisting of at least 30 minutes of activity at least 3 to 5 times a week will improve your overall cardiovascular conditioning and may lower your risk of gestational diabetes and hypertension.  Work to achieve a body weight that is within the normal range. Women who are obese as well as those who are extremely thin are at increased risk for pregnancy complications including fetal growth abnormalities and  delivery.  Avoid extreme diet practices. Diets that restrict intake of certain food groups often do not provide balanced  nutrition.   Certain types of fish may be contaminated with mercury, a substance that may harm a developing fetus. Shark, swordfish, jeaneth mackerel, and tilefish have high levels of mercury and should be avoided. You may consume up to 6 ounces of fresh tuna or canned (albacore/white or light) tuna each week. The safety of locally-caught fish (in rivers or lakes) varies; local precautions should be followed and no more than 6 ounces of this type of fish should be consumed in a week. Women who are pregnant or who may become pregnant can safely eat up to 12 ounces of other types of fish each week. (Based on EPA & FDA March, 2004 recommendations)  Prevent HIV infection--use safe sex practices if any of your activities could put you at risk for acquiring HIV or other sexually transmitted diseases  Avoid alcohol, tobacco, and illicit drugs before and during pregnancy. Tobacco lowers the rate of fertilization and can cause placental dysfunction. Alcohol is the most common environmental cause of mental retardation in the [place] Although there is no reason for concern if you have a couple of drinks before you realize you are pregnant, there is no clear answer regarding how much alcohol is safe during pregnancy. Therefore, it is best to avoid alcohol during pregnancy.  Optimize your overall health by maintaining good control of any medical conditions you have such as hypertension, asthma, diabetes, thyroid disease.  Once you are pregnant, seek early prenatal care to allow for accurate pregnancy dating and confirmation of a normal ongoing gestation     Charcot Chary Tooth muscular atrophy  She has a history of CMT. She endorses leg pains and recently had surgery due to this condition.   Overall this condition should not be affected by pregnancy.     Type 2 diabetes mellitus without complication  The patient was counseled regarding the risks of diabetes in pregnancy. These risks include but are not limited to an increased risk  of , preeclampsia/gestational hypertension, fetal structural anomalies, macrosomia, prematurity, shoulder dystocia/birth injury,  hyperbilirubinemia and electrolyte issues, pulmonary immaturity and sudden stillbirth especially in those patients with poor glucose control. I also discussed with the patient the need for increased fetal surveillance with serial fetal growth assessments and third trimester fetal testing. I also reviewed the possibility of need for early delivery in those with poor glucose control or evidence of fetal growth abnormalities.  She does not have a blood sugar log with her today but states that her blood sugars are well controlled.  She is currently on metformin and NovoLog insulin with meals.  She believes the metformin is causing GI upset and contributing to her needing another medication.  I support her getting off metformin and increasing NovoLog.  She may need the additional long-acting insulin which is perfectly appropriate for pregnancy.  Our goal blood sugars for pregnancy would be fastings under 95 and postprandial blood sugars under 120 after 2 hours.  I discussed with her that the better her A1c is the lower her risk of both maternal and fetal complications.    Recommendations:  Maternal ophthalmic exam (if hasn't been performed in last year) and podiatry exam  Diabetic education referral and counseling if needed re: goal blood sugars (< 95 mg/dl for fasting, < 120 mg/dl for 2 hour postprandial)  Medications:   Start low dose aspirin 81 mg daily at 12-16 weeks for preeclampsia risk reduction (will be initiated in pregnancy)  1 mg folic acid daily  Regimen: Please consider DC metformin per patient preference and increase insulin dosing. We will not be titrating her insulin as she has a PCP and is not pregnancy.   She will submit her log to our office on a weekly basis via email or portal for review and management during pregnancy  Ultrasounds with MFM:  Nuchal  translucency scan at 12-13 weeks  Targeted anatomy survey at 20 weeks  Serial growth ultrasounds q 4-6 weeks at 26-28 weeks     A fetal echocardiogram is recommended at 22-24 weeks with pediatric cardiology     Initiate  surveillance at 32 weeks:   Weekly BPP or NST + AFV if good control.   If control is poor, twice weekly  fetal surveillance is recommended with BPP alternating with NST   Delivery timin 0/7 to 38 and 6/7 weeks if under good control without comorbidities  37 0/7 to 37 and 67 weeks if longstanding diabetes or poorly controlled, polyhydramnios, EFW>90th percentile, or BMI >= 40  36 0/7 to 37 and 6/7 weeks if vascular complications, prior stillbirth or other complicating conditions.  Recommend consideration of earlier delivery if IUGR, HTN, or other complications  Recommend offering  for delivery is EFW is 4500g or more near the time of delivery      Hypothyroidism  She reports a history of hypothyroidism as well as abnormal cortisol levels.  I do not have these records available today.  She has seen endocrinology but desires 2nd opinion.  I offered referral today to endocrinology through [facility] and she would like to pursue this option.  Referral sent.    Obesity (BMI 30-39.9)  There are  risks associated with obesity which include and increased risk of hypertension, preeclampsia, gestational diabetes, venous thromboembolic disease,  delivery, macrosomia (with resultant shoulder dystocia, obstetric sphincter injury), IUFD, longer first stage of labor, decreased TOLAC success rate, emergent & scheduled  & complications of  (prolonged OR time, delayed delivery, excessive EBL, infection, wound separation/infection, higher spinal failure rate, more difficult intubation).  Obesity is also associated with fetal anomalies, specifically neural tube defects.  Studies have shown that the rate of complication increases with rising BMI and thus  pregnancies with maternal morbid obesity are at increased risk.      BMI 39        Anxiety  She reports a history of anxiety and depression and is taking valium and lexapro. She may be at an increased risk for peripartum and postpartum mood disorders. Benzodiazepines can be associated with  abstinence syndrome and I recommend using as sparingly as possible or stopping if she tolerates.    It is important to optimize mental health conditions during pregnancy in an effort to reduce the risk of postpartum mood disorders. There are options for management including psychotherapy, counseling, cognitive behavioral therapy, exercise/yoga, journaling, and psychiatry services.       60 minutes of total time spent on the encounter, which includes face to face time and non-face to face time preparing to see the patient (eg, review of tests), obtaining and/or reviewing separately obtained history, documenting clinical information in the electronic or other health record, independently interpreting results (not separately reported) and communicating results to the patient/family/caregiver, or care coordination (not separately reported).      Joseline Pryor  Maternal-Fetal Medicine    Electronically Signed by Joseline Pryor May 16, 2024

## 2024-05-16 NOTE — ASSESSMENT & PLAN NOTE
She reports a history of anxiety and depression and is taking valium and lexapro. She may be at an increased risk for peripartum and postpartum mood disorders. Benzodiazepines can be associated with  abstinence syndrome and I recommend using as sparingly as possible or stopping if she tolerates.    It is important to optimize mental health conditions during pregnancy in an effort to reduce the risk of postpartum mood disorders. There are options for management including psychotherapy, counseling, cognitive behavioral therapy, exercise/yoga, journaling, and psychiatry services.

## 2024-05-16 NOTE — ASSESSMENT & PLAN NOTE
Relative hypothyroidism with recent outside labs showing TSH values just above 4.5.  In setting of planned pregnancy she was placed on Levothyroxine 25 mcg once daily approx 5 months ago.  Due for repeat TSH at this time, labs to be done this week.      Will adjust dose as needed with goal for TSH to remain below 2.5

## 2024-05-16 NOTE — TELEPHONE ENCOUNTER
----- Message from Alcides Estrella DO sent at 5/16/2024  1:18 PM CDT -----  Can you please set up labs tomorrow morning in [place], [place] for this patient at [facility] lab there. First available morning lab visit should be fine (8am etc.).   I believe there is an [facility] lab in [facility] for her to go to.  Let me know if any issues with that.  Thanks.

## 2024-05-16 NOTE — ASSESSMENT & PLAN NOTE
Reviewed recent labs that were completed outside of [facility].  These included a dexamethasone suppression test which showed her 8 am morning cortisol and ACTH to both be low.  Explained that this is a normal result and is meant to screen for hypercortisolism.  Cutoff to rule out an issue was met with cortisol of 0.97 (cutoff of less than 1.8).      Requested she takes pictures or scans of outside labs and send to us for completeness so they can be reviewed.  At the moment no concerns for hypercortisolism and no symptoms to suggest adrenal insuffiencey, simply a normal lab response to the DST causing the cortisol levels to be low.

## 2024-05-16 NOTE — TELEPHONE ENCOUNTER
I print the labs and faxed them to the lab in [facility].They mentioned that patients don't have to schedule appointment just walk in. I'd called and notified the patient and she was fine with that.    Lab fax # [phone]

## 2024-05-16 NOTE — ASSESSMENT & PLAN NOTE
Reviewed oral recall of blood glucose levels from finger sticks.  Morning fasting glucose levels have been above target and postprandial glucose levels have been elevated at times as well.  Not using CGM.  Recent changes to her home medicines with discontinuation of Lantus and NPH ordered in it's place.  Had been on GLP-1 RA in past but now given pregnancy plans off this and using Novolog with meals.  Metformin at lower dose continues for now.      Plan:  - Will clarify with maternal fetal medicine if they have concerns about Lantus (insulin glargine) use - can potentially simplify regimen if not  - Continue NPH insulin, 10 units twice daily  - Continue Novolog 5-15 units based on current sliding regimen with meals (will need 8 units with meals plus sliding scale once CGM approved)  - Continue Metformin 500 mg BID (can consider stopping if GI feels bowel issues are related to this therapy)    - Will order Dexcom G7 sensors (confirmed phone is compatible - iPhone)   - Will give instructions for downloading Dexcom and Clarity apps and sharing info with clinic remotely  - Continue use of glucometer with at least morning fasting BG and 1-2 hour post-prandial BG values (4 x daily checks)  - Healthy diabetic diet and physical activity/exercise as tolerated given recent surgery and need for a cast  - If she becomes pregnant we will need even closer monitoring, for now plan 3-4 week virtual follow up to check on current insulin doses    Pregnancy BG goals  Fasting: <95  1 hour post prandial: <140  2 hour post prandial: <120

## 2024-05-16 NOTE — ASSESSMENT & PLAN NOTE
There are  risks associated with obesity which include and increased risk of hypertension, preeclampsia, gestational diabetes, venous thromboembolic disease,  delivery, macrosomia (with resultant shoulder dystocia, obstetric sphincter injury), IUFD, longer first stage of labor, decreased TOLAC success rate, emergent & scheduled  & complications of  (prolonged OR time, delayed delivery, excessive EBL, infection, wound separation/infection, higher spinal failure rate, more difficult intubation).  Obesity is also associated with fetal anomalies, specifically neural tube defects.  Studies have shown that the rate of complication increases with rising BMI and thus pregnancies with maternal morbid obesity are at increased risk.      BMI 39

## 2024-05-16 NOTE — PATIENT INSTRUCTIONS
As reviewed today we would like you to continue with the NPH (Novolin) and Novolog insulin for now.  We may be able to adjust from the NPH back to Lantus (insulin Glargine) if maternal fetal medicine is ok with this.  We will message them to check.  Below I have included additional information about diabetes goals during pregnancy.  Most info below should apply to you.  We will work on getting a Dexcom CGM set up through your insurance via the pharmacy.      Current Insulin Recommendation:  NPH insulin - 10 units twice daily (approx 12 hours apart)  Novolog with meals, 8 units plus sliding scale (below) per pre-meal blood sugar    Novolog correction based on before meal glucose:  100-150: add 1 unit  151-200: add 2 unit  201-250: add 3 units  251-300: add 4 units  301-350: add 5 units  >350: add 6 units    Check glucose 4-5 times a day for now while we await Dexcom set up.    Instructions for Dexcom Mae Set Up:    You will need the Dexcom G7 mae to transmit data from your sensor to your phone.  The other mae needed will be Clarity.     To share your dexcom data with me you will need to download the dexcom clarity mae on your phone (this is a separate mae than the dexcom mae that allows you to see your blood sugars).  Login for Clarity will be the same that you used to sign up for Dexcom HipLink mae.    To give our clinic access to your numbers:    Download the Dexcom Clarity mae from your mae store  In the Clarity mae, Choose Profile >  Authorize Sharing > enter code [email or web address]  Send a [employer] message to me or your doctor once done so we can be sure data is sharing    Please note that I do not routinely monitor these numbers but can look at them at the time of a visit or if we need to make changes between visits.  If you notice concerns between visits you need to let us know so we can review.          Treatment goals for patients with pre-existing diabetes during pregnancy  Glucose monitoring:  Check glucose levels  before every meal and 1 hour and/or 2 hours after every meal  May need to measure around 3 AM (if you have concerning symptoms suggestive of a low especially if on NPH insulin)  If able, consider use of continuous glucose monitor (CGM) like Dexcom or Freestyle Winnie    Treatment goals:  Fasting blood glucose concentration: 60-95 mg/dL   Premeal blood glucose concentration: 60-99 mg/dL  One-hour postprandial blood glucose concentration: <140 mg/dL   Two-hour postprandial glucose concentration: <120 mg/dL     Time in range goals when on sensor:  (below is an example of the data Dexcom gives us on reports from 2 weeks of data)             A1c goals:  Normalization of A1c to <6%  if possible without severe low blood glucose     If low blood sugar:   Use standard treatment for glucose less than 60 mg/dL  If glucose <60 mg/dL, eat 15 grams of carbohydrate  Recheck in 15 minutes and if again less than 60, repeat treatment by eating another 15 grams of carbohydrate  Severe hypoglycemia is defined as an episode in which the patient experiences coma, seizure, suspected seizure, impairment sufficient to require assistance of another person  Blood glucose goals must be relaxed for patients with hypoglycemia unawareness (not able to feel low blood glucose) or frequent low blood glucose     Choice of insulin  Per ADA guidelines none of the currently available human insulin preparations have been demonstrated to cross the placenta and do not affect the fetal blood glucose.  No specific insulin regimen is recommended over another for the treatment of diabetes in pregnancy.   Recommend combination of lispro/aspart insulin and NPH/insulin detemir/insulin glargine is what we typically do in pregnancy.  Of note insulin detemir was recently discontinued in the [place] and is no longer an option.    Insulin lispro and aspart have been investigated in pregnancy and shown to have acceptable safety profiles, minimal transfer across the placenta,  and no evidence of causing cancer  Insulin glargine is a long-acting insulin with coverage lasting approx 24 hours   The safety and efficacy of neutral protamine Tam (NPH) in pregnancy are supported by abundant observational data published over decades. Importantly, as an intermediate-acting insulin, doses can be adjusted frequently and quickly in response to variable caloric intake and insulin sensitivity in pregnant women.    Nutrition goals:  All pregnant women should receive nutrition counseling by a dietitian. Visit should review carbohydrate counting, sensor technology, daily food records. 1-3 visits as needed.    30-45 grams of carbs with breakfast and 45-60 grams of carbs for lunch and dinner  Two 15 gram carb snacks daily  1800 to 2500 kcal/day. Please discuss this goal with your OBGYN/maternal fetal medicine physician to ensure this is healthy for you and your baby  See below for information on calorie goals and meal composition    Monitoring and visits:  Endocrinology visits every 1-4 weeks with phone visits as needed (this time frame of visits is during pregnancy - so notify us if you become pregnant)  At each visit will review:  Blood glucose log and/or CGM report  A1c check every 4-8 weeks (CGM also gives us info relative to A1c values)  Visit with diabetes care specialist in early pregnancy and again as needed  Ophthalmology (eye doctor visit)  During first trimester and repeat dilated eye exam each trimester pending degree of retinopathy  Consider mental health counseling as depression tends to develop or worsen during and after pregnancy    Further nutrition information:

## 2024-05-16 NOTE — ASSESSMENT & PLAN NOTE
She reports a history of hypothyroidism as well as abnormal cortisol levels.  I do not have these records available today.  She has seen endocrinology but desires 2nd opinion.  I offered referral today to endocrinology through [facility] and she would like to pursue this option.  Referral sent.

## 2024-05-17 ENCOUNTER — PATIENT MESSAGE (OUTPATIENT)
Dept: MATERNAL FETAL MEDICINE | Facility: CLINIC | Age: 39
End: 2024-05-17
Payer: COMMERCIAL

## 2024-05-17 DIAGNOSIS — E11.9 TYPE 2 DIABETES MELLITUS WITHOUT COMPLICATION, UNSPECIFIED WHETHER LONG TERM INSULIN USE: ICD-10-CM

## 2024-05-17 RX ORDER — INSULIN GLARGINE 100 [IU]/ML
18 INJECTION, SOLUTION SUBCUTANEOUS DAILY
Qty: 9 ML | Refills: 6 | Status: SHIPPED | OUTPATIENT
Start: 2024-05-17 | End: 2025-05-17

## 2024-05-22 ENCOUNTER — PATIENT MESSAGE (OUTPATIENT)
Dept: MATERNAL FETAL MEDICINE | Facility: CLINIC | Age: 39
End: 2024-05-22
Payer: COMMERCIAL

## 2024-05-27 ENCOUNTER — TELEPHONE (OUTPATIENT)
Dept: ENDOCRINOLOGY | Facility: CLINIC | Age: 39
End: 2024-05-27
Payer: COMMERCIAL

## 2024-05-27 NOTE — TELEPHONE ENCOUNTER
LM for patient to informing her we don't have anything sooner her f/u was on 9/23 , but she canceled it so she can give us a call back.

## 2024-05-27 NOTE — TELEPHONE ENCOUNTER
----- Message from Raisa Irwin sent at 5/15/2024  2:29 PM CDT -----  Regarding: Appt Confirm  Contact: [phone]  Kayla Sneddon calling regarding Appointment Access  (message) for # pt called wanted to r/s appt to something sooner I found an appt tomorrow at 11am virtual with . please call to advise and pt confirmed appt

## 2024-05-27 NOTE — TELEPHONE ENCOUNTER
----- Message from Raisa Irwin sent at 5/15/2024  2:29 PM CDT -----  Regarding: Appt Confirm  Contact: [phone]  Kayla Sneddon calling regarding Appointment Access  (message) for # pt called wanted to r/s appt to something sooner I found an appt tomorrow at 11am virtual with . please call to advise and pt confirmed appt

## 2024-06-03 ENCOUNTER — PATIENT MESSAGE (OUTPATIENT)
Dept: ENDOCRINOLOGY | Facility: CLINIC | Age: 39
End: 2024-06-03
Payer: COMMERCIAL

## 2024-06-04 ENCOUNTER — PATIENT MESSAGE (OUTPATIENT)
Dept: ENDOCRINOLOGY | Facility: CLINIC | Age: 39
End: 2024-06-04
Payer: COMMERCIAL

## 2024-06-10 ENCOUNTER — PATIENT MESSAGE (OUTPATIENT)
Dept: ENDOCRINOLOGY | Facility: CLINIC | Age: 39
End: 2024-06-10
Payer: COMMERCIAL

## 2024-06-12 ENCOUNTER — PATIENT MESSAGE (OUTPATIENT)
Dept: ENDOCRINOLOGY | Facility: CLINIC | Age: 39
End: 2024-06-12

## 2024-06-12 ENCOUNTER — OFFICE VISIT (OUTPATIENT)
Dept: ENDOCRINOLOGY | Facility: CLINIC | Age: 39
End: 2024-06-12
Payer: COMMERCIAL

## 2024-06-12 DIAGNOSIS — E11.9 TYPE 2 DIABETES MELLITUS WITHOUT COMPLICATION, UNSPECIFIED WHETHER LONG TERM INSULIN USE: Primary | ICD-10-CM

## 2024-06-12 DIAGNOSIS — E66.9 OBESITY (BMI 30-39.9): ICD-10-CM

## 2024-06-12 DIAGNOSIS — E11.65 TYPE 2 DIABETES MELLITUS WITH HYPERGLYCEMIA, WITH LONG-TERM CURRENT USE OF INSULIN: ICD-10-CM

## 2024-06-12 DIAGNOSIS — E03.9 HYPOTHYROIDISM, UNSPECIFIED TYPE: ICD-10-CM

## 2024-06-12 DIAGNOSIS — G60.0 CHARCOT MARIE TOOTH MUSCULAR ATROPHY: ICD-10-CM

## 2024-06-12 DIAGNOSIS — Z79.4 TYPE 2 DIABETES MELLITUS WITH HYPERGLYCEMIA, WITH LONG-TERM CURRENT USE OF INSULIN: ICD-10-CM

## 2024-06-12 PROBLEM — R79.89 LOW SERUM CORTISOL LEVEL: Status: RESOLVED | Noted: 2024-05-16 | Resolved: 2024-06-12

## 2024-06-12 PROCEDURE — 95251 CONT GLUC MNTR ANALYSIS I&R: CPT | Mod: NDTC,,, | Performed by: INTERNAL MEDICINE

## 2024-06-12 PROCEDURE — 99214 OFFICE O/P EST MOD 30 MIN: CPT | Mod: 95,,, | Performed by: INTERNAL MEDICINE

## 2024-06-12 NOTE — PROGRESS NOTES
ENDOCRINOLOGY RETURN PATIENT VISIT: 06/12/2024    The patient location is: Home in [place]  The chief complaint leading to consultation is: Diabetes and Thyroid    Visit type: audiovisual    Face to Face time with patient: 20  30 minutes of total time spent on the encounter, which includes face to face time and non-face to face time preparing to see the patient (eg, review of tests), Obtaining and/or reviewing separately obtained history, Documenting clinical information in the electronic or other health record, Independently interpreting results (not separately reported) and communicating results to the patient/family/caregiver, or Care coordination (not separately reported).     Each patient to whom he or she provides medical services by telemedicine is:  (1) informed of the relationship between the physician and patient and the respective role of any other health care provider with respect to management of the patient; and (2) notified that he or she may decline to receive medical services by telemedicine and may withdraw from such care at any time.      Previously seen by Dr. Jon and Dr. Cohen, new to me      Subjective:      Patient ID: Kayla Aguilar is a 38 y.o. female.    Chief Complaint:  Diabetes and Thyroid    History of Present Illness  Kayla Aguilar has a medical history of type 2 diabetes, Charcot foot, and current plans for pregnancy.  She is following with a fertility clinic and also maternal fetal medicine given her age and plans for pregnancy with underlying diabetes.      Referred by reproductive endocrinology (Dr. Antione Childs) due to plans for attempts at pregnancy and a history of diabetes.  Had seen endocrinologist in [place] in January but has not liked them and their follow up on labs and such.  She is now establishing with [facility].  She also recently changed her [facility] physician to [facility] as well.       Fertility plans.  Had plans for pregnancy attempts backed off so she could  "have her recent foot surgery completed (charcot florencia tooth).  She remains in a cast at this time.  Plans coming up for IUI cycle.  Likely in the next 1-2 months she estimates.      11/27/2023 Labs (in media)  AMH:  1.31  A1c: 7.2 %  (also more recent one in pat 1-2 months of 7.1%)  Est Avg Glucose: 161  Prolactin: 10.4  TSH: 4.510  FSH: 8.12    Following the above labs she had thyroid hormone started in December.  Has been on Levothyroxine 25 mcg since without a repeat TSH.  She had some labs checked with her T3 level being low she recalls.     Normal dexamethasone suppression test results performed with ACTH <5 and Cortisol of 0.97      Regarding diabetes:    Initially Diagnosed with T2D:  2015    Known diabetic complications: peripheral neuropathy, Charcot foot  Cardiovascular risk factors: diabetes mellitus, obesity (BMI >= 30 kg/m2), and sedentary lifestyle    Since last visit has started Dexcom and MDI as below  Last week had UTI so higher sugars but now treated. She has been adjusting doses to help combat highs    Maybe fertility treatment July/August    Current diabetic medications include:   Lantus- has been adjusting on her own due to high sugars, took 16 units last few days  Novolog- 10-12 units with meals, sometimes 15-17 if higher before meals  Taking when she starts eating       Novolog correction based on before meal glucose:  130-180: add 1 unit  181-230: add 2 unit  231-280: add 3 units  281-330: add 4 units  331-380: add 5 units  >350: add 6 units    Metformin 500 mg twice daily- difficult on stomach    Other medications tried:  Lantus (told not pregnancy safe by outside [facility] and fertility physician)  Ozempic in the past (off due to GI side effects and plans for pregnancy)  Trulicity   Xigduo (Metformin and dapagliflozin combo)      Diet: on average, 2 meals per day  Usually no breakfast   Lunch "sub in a tub" from [employer]- double meat, vinegar; [employer] yesterday- 1.5 hamburgers and medium " "fries  Dinner- chili and crackers     Exercise:  VR workouts , mobility limited due to being in a cast.     Glucose Trends: Dexcom in media        Any episodes of hypoglycemia? yes - intermittent, but not often.  Had recent drop to 67.      Family Hx:  Denies FH of diabetes or thyroid disorder             Wt Readings from Last 10 Encounters:   05/14/24 104.3 kg (230 lb)       Diabetes Management Status    Statin: Not taking  ACE/ARB: Not taking      Screening or Prevention Patient's value   HgA1C Testing and Control No results found for: "HGBA1C"   Lipid profile  Most Recent Lipid Panel Health Maintenance Topic Completion: Not Found   LDL control (goal LDL <70) No results found for: "LDLCALC"   Nephropathy screening No results found for: "LABMICR"  Lab Results   Component Value Date    PROTEINUA Negative 08/11/2022      Blood pressure BP Readings from Last 3 Encounters:   05/14/24 137/87      Dilated retinal exam Most Recent Eye Exam Date: Not Found   Foot exam Most Recent Foot Exam Date: Not Found        Regarding Hypothyroidism:    Had TSH levels at 4.5 (no prior issues with thyroid function)    Started on LT4 in setting of planned pregnancy and TSH above 2.5    - Current relevant medications: levothyroxine T4 (Synthroid) 25 mcg daily    - Takes thyroid medications properly  - Plans for pregnancy at this time: Yes, working with fertility clinic.    No results found for: "TSH", "FREET4", "TOTALT3", "THYROIDSTIMI", "THYROPEROXID", "NA", "GLU"  There were no vitals filed for this visit.       Objective:     LMP 04/16/2024 (Exact Date)   BP Readings from Last 3 Encounters:   05/14/24 137/87     Wt Readings from Last 1 Encounters:   05/14/24 1321 104.3 kg (230 lb)     There is no height or weight on file to calculate BMI.       Lab Review:   No results found for: "HGBA1C"  No results found for: "CHOL", "HDL", "LDLCALC", "TRIG", "CHOLHDL"  No results found for: "NA", "K", "CL", "CO2", "GLU", "BUN", "CREATININE", " ""CALCIUM", "PROT", "ALBUMIN", "BILITOT", "ALKPHOS", "AST", "ALT", "ANIONGAP", "ESTGFRAFRICA", "EGFRNONAA", "TSH"  No results found for: "[id number]"    Assessment and Plan     Type 2 diabetes mellitus with hyperglycemia, with long-term current use of insulin  Dexcom reviewed and with higher sugars last few weeks in setting of UTI  Also not timing prandial insulin correctly and with some higher carb meals  Drift up in AM due to no breakfast so discussed trying to have small meal with some novolog to prevent this  Reviewed carb goals, refer to education for carb counting  Will do the following:    Patient Instructions   Take novolog 15-20 minutes before you start eating to give this time to start working    Try to have a small breakfast so you can take a low dose of novolog 5-6 units to help prevent rise in sugar throughout the morning  Increase lantus to 18 units once daily  Continue novolog 10-12 units with lunch and dinner    We will set you up for diabetes education to learn more about carb counting    Diet/Meal carbohydrate breakdown:  Breakfast: 30g  Lunch: 45-60g  Dinner: 45-60g   Snacks:  15-30g snacks; incorporate protein at meals/snacks.     Aim for ~175 grams of carbohydrate daily, divided into 3 regularly-spaced small-to-moderate sized meals and 2-4 snacks.    Try taking metformin with full meals only      Hypothyroidism  Last TSH at goal   Cont LT4 25 mcg daily    Charcot Chary Tooth muscular atrophy  DM management as above    Obesity (BMI 30-39.9)  Increases insulin resistance  Benefits from metformin and would try to continue if can tolerate          **Visit today included increased complexity associated with the care of the problems addressed and managing the longitudinal care of the patient due to the serious and/or complex managed problems**      Porsche Genao    "

## 2024-06-12 NOTE — PATIENT INSTRUCTIONS
0 = independent Take novolog 15-20 minutes before you start eating to give this time to start working    Try to have a small breakfast so you can take a low dose of novolog 5-6 units to help prevent rise in sugar throughout the morning  Increase lantus to 18 units once daily  Continue novolog 10-12 units with lunch and dinner    We will set you up for diabetes education to learn more about carb counting    Diet/Meal carbohydrate breakdown:  Breakfast: 30g  Lunch: 45-60g  Dinner: 45-60g   Snacks:  15-30g snacks; incorporate protein at meals/snacks.     Aim for ~175 grams of carbohydrate daily, divided into 3 regularly-spaced small-to-moderate sized meals and 2-4 snacks.    Try taking metformin with full meals only

## 2024-06-12 NOTE — ASSESSMENT & PLAN NOTE
Dexcom reviewed and with higher sugars last few weeks in setting of UTI  Also not timing prandial insulin correctly and with some higher carb meals  Drift up in AM due to no breakfast so discussed trying to have small meal with some novolog to prevent this  Reviewed carb goals, refer to education for carb counting  Will do the following:    Patient Instructions   Take novolog 15-20 minutes before you start eating to give this time to start working    Try to have a small breakfast so you can take a low dose of novolog 5-6 units to help prevent rise in sugar throughout the morning  Increase lantus to 18 units once daily  Continue novolog 10-12 units with lunch and dinner    We will set you up for diabetes education to learn more about carb counting    Diet/Meal carbohydrate breakdown:  Breakfast: 30g  Lunch: 45-60g  Dinner: 45-60g   Snacks:  15-30g snacks; incorporate protein at meals/snacks.     Aim for ~175 grams of carbohydrate daily, divided into 3 regularly-spaced small-to-moderate sized meals and 2-4 snacks.    Try taking metformin with full meals only

## 2024-06-13 ENCOUNTER — TELEPHONE (OUTPATIENT)
Dept: ENDOCRINOLOGY | Facility: CLINIC | Age: 39
End: 2024-06-13
Payer: COMMERCIAL

## 2024-06-13 ENCOUNTER — PATIENT MESSAGE (OUTPATIENT)
Dept: ENDOCRINOLOGY | Facility: CLINIC | Age: 39
End: 2024-06-13
Payer: COMMERCIAL

## 2024-06-13 NOTE — TELEPHONE ENCOUNTER
Called pt to get her diabetes education rescheduled. I got her scheduled with the date and time she wanted.

## 2024-06-27 ENCOUNTER — CLINICAL SUPPORT (OUTPATIENT)
Dept: DIABETES | Facility: CLINIC | Age: 39
End: 2024-06-27
Payer: COMMERCIAL

## 2024-06-27 DIAGNOSIS — E11.9 TYPE 2 DIABETES MELLITUS WITHOUT COMPLICATION, UNSPECIFIED WHETHER LONG TERM INSULIN USE: ICD-10-CM

## 2024-06-27 PROCEDURE — G0108 DIAB MANAGE TRN  PER INDIV: HCPCS | Mod: 95,,, | Performed by: INTERNAL MEDICINE

## 2024-07-02 NOTE — PROGRESS NOTES
"Diabetes Care Specialist Virtual Visit Note   The patient location is: Home in [place]  The chief complaint leading to consultation is: Diabetes  Visit type: audiovisual  Total time spent with patient: 50 min   Each patient to whom he or she provides medical services by telemedicine is:  (1) informed of the relationship between the physician and patient and the respective role of any other health care provider with respect to management of the patient; and (2) notified that he or she may decline to receive medical services by telemedicine and may withdraw from such care at any time.      Diabetes Care Specialist Progress Note  Author: Sue Green RN, CDE  Date: 6/27/2024    Program Intake  Reason for Diabetes Program Visit:: Initial Diabetes Assessment  Current diabetes risk level:: moderate  In the last 12 months, have you:: none  Permission to speak with others about care:: no  Continuous Glucose Monitoring  Patient has CGM: Yes  Personal CGM type:: Dexcom G7  GMI Date: 06/27/24  GMI Value: 6.3 %    No results found for: "LABA1C", "HGBA1C"    Clinical            There is no height or weight on file to calculate BMI.    Patient Health Rating  Compared to other people your age, how would you rate your health?: Good    Problem Review  Reviewed Problem List with Patient: yes  Active comorbidities affecting diabetes self-care.: yes  Comorbidities:  (Obesity)  Reviewed health maintenance: yes    Clinical Assessment  Current Diabetes Treatment: Oral Medication, Diet, Insulin (Lantus 24 units; Metformin 500 qd; Novolog 8 units ac meals)  Have you ever experienced hypoglycemia (low blood sugar)?: no  Have you ever experienced hyperglycemia (high blood sugar)?: no    Medication Information  How do you obtain your medications?: Patient drives  How many days a week do you miss your medications?: Never  Do you sometimes have difficulty refilling your medications?: No  Medication adherence impacting ability to self-manage " diabetes?: No         Nutritional Status  Diet: Regular  Meal Plan 24 Hour Recall: Breakfast, Lunch, Dinner, Snack  Meal Plan 24 Hour Recall - Breakfast: usually skips, but will eat bisquet/sausage sandwich when traveling  Meal Plan 24 Hour Recall - Lunch: subway in bowl will eat out  Meal Plan 24 Hour Recall - Dinner: chicken, breads, brocolli  Meal Plan 24 Hour Recall - Snack: sometimes BT snack, cheese, nuts water  Change in appetite?: No  Dentation:: Intact  Recent Changes in Weight: No Recent Weight Change  Current nutritional status an area of need that is impacting patient's ability to self-manage diabetes?: No    Additional Social History    Support  Does anyone support you with your diabetes care?: yes  Who supports you?: spouse  Who takes you to your medical appointments?: self  Does the current support meet the patient's needs?: Yes  Is Support an area impacting ability to self-manage diabetes?: No    Access to Mass Media & Technology  Does the patient have access to any of the following devices or technologies?: Smart phone, Home computer  Media or technology needs impacting ability to self-manage diabetes?: No    Cognitive/Behavioral Health  Alert and Oriented: Yes  Difficulty Thinking: No  Requires Prompting: No  Requires assistance for routine expression?: No  Cognitive or behavioral barriers impacting ability to self-manage diabetes?: No         Communication  Language preference: English  Hearing Problems: No  Vision Problems: No  Communication needs impacting ability to self-manage diabetes?: No    Health Literacy  Preferred Learning Method: Face to Face, Demonstration, Hands On  How often do you need to have someone help you read instructions, pamphlets, or written material from your doctor or pharmacy?: Never  Health literacy needs impacting ability to self-manage diabetes?: No      Diabetes Self-Management Skills Assessment    Diabetes Disease Process/Treatment Options  Patient/caregiver able to  state what happens when someone has diabetes.: yes  Patient/caregiver knows what type of diabetes they have.: yes  Diabetes Type : Type II  Diabetes Disease Process/Treatment Options: Skills Assessment Completed: Yes  Assessment indicates:: Knowledge deficit  Area of need?: Yes    Nutrition/Healthy Eating  Challenges to healthy eating:: eating out, going to parties, snacking between meals and at night  Method of carbohydrate measurement:: no method  Patient can identify foods that impact blood sugar.: yes  Patient-identified foods:: fruit/fruit juice, sweets, yogurt, starchy vegetables (corn, peas, beans), soda, milk, starches (bread, pasta, rice, cereal)  Nutrition/Healthy Eating Skills Assessment Completed:: Yes  Assessment indicates:: Instruction Needed  Area of need?: Yes    Physical Activity/Exercise  Patient's daily activity level:: lightly active  Patient formally exercises outside of work.: no  Reasons for not exercising:: time constraints  Patient can identify forms of physical activity.: yes  Stated forms of physical activity:: any movement performed by muscles that uses energy  Patient can identify reasons why exercise/physical activity is important in diabetes management.: yes  Identified reasons:: keeps body and joints flexible  Physical Activity/Exercise Skills Assessment Completed: : Yes  Assessment indicates:: Adequate understanding  Area of need?: No    Medications  Patient is able to describe current diabetes management routine.: yes  Diabetes management routine:: diet, insulin, oral medications  Patient is able to identify current diabetes medications, dosages, and appropriate timing of medications.: yes  Patient understands the purpose of the medications taken for diabetes.: yes  Patient reports problems or concerns with current medication regimen.: no  Medication Skills Assessment Completed:: Yes  Assessment indicates:: Adequate understanding  Area of need?: No    Home Blood Glucose  Monitoring  Patient states that blood sugar is checked at home daily.: yes  Personal CGM type:: Dexcom G7  Home Blood Glucose Monitoring Skills Assessment Completed: : Yes  Assessment indicates:: Adequate understanding  Area of need?: No    Acute Complications  Patient is able to identify types of acute complications: Yes  Patient Identified:: Hypoglycemia  Patient is able to state the basic meaning of hypoglycemia?: Yes  Acute Complications Skills Assessment Completed: : Yes  Assessment indicates:: Instruction Needed  Area of need?: Yes    Chronic Complications  Patient can identify major chronic complications of diabetes.: yes  Stated chronic complications:: neuropathy/nerve damage  Patient can identify ways to prevent or delay diabetes complications.: yes  Stated ways to prevent complications:: maintaining optimal blood glucose control  Patient is aware that having diabetes increases risk of heart disease?: No  Chronic Complications Skills Assessment Completed: : Yes  Assessment indicates:: Instruction Needed  Area of need?: Yes    Psychosocial/Coping  Patient can identify ways of coping with chronic disease.: yes  Patient-stated ways of coping with chronic disease:: support from loved ones  Psychosocial/Coping Skills Assessment Completed: : Yes  Assessment indicates:: Adequate understanding  Area of need?: No        Assessment Summary and Plan    Based on today's diabetes care assessment, the following areas of need were identified:          6/27/2024    12:01 AM   Social   Support No   Access to Mass Media/Tech No   Cognitive/Behavioral Health No   Communication No   Health Literacy No            6/27/2024    12:01 AM   Clinical   Medication Adherence No   Nutritional Status No            6/27/2024    12:01 AM   Diabetes Self-Management Skills   Diabetes Disease Process/Treatment Options Yes, Yes, Provided ADA DM management guide and provided instruction regarding signs and symptoms, risk factors of diabetes,  increased risk for cardio and cerebral vascular events.  We also talked about pregnancy parameters for bg control.    Nutrition/Healthy Eating Yes, see care planning   Physical Activity/Exercise No   Medication No   Home Blood Glucose Monitoring No   Acute Complications Yes, Reviewed hypoglycemia, s/s and appropriate tx. Have/get quick acting glucose tablets at hand.   Chronic Complications Yes, Discussed long term complications of diabetes. Patient agrees to have the following diabetes theresa maintenance screenings/appointments yearly eye exams, foot exams.    Psychosocial/Coping No          Today's interventions were provided through individual discussion, instruction, and written materials were provided.      Patient verbalized understanding of instruction and written materials.  Pt was able to return back demonstration of instructions today. Patient understood key points, needs reinforcement and further instruction.     Diabetes Self-Management Care Plan:    Today's Diabetes Self-Management Care Plan was developed with Kayla's input. Kayal has agreed to work toward the following goal(s) to improve his/her overall diabetes control.      Care Plan: Diabetes Management   Updates made since 6/2/2024 12:00 AM        Problem: Healthy Eating         Long-Range Goal: Eat 2-3 meals daily with 30-45g/2-3 servings of Carbohydrate per meal. Limit snacking in between meal to 1 serving (15 grams).    Start Date: 6/27/2024   Priority: High   Note:    Reviewed meal planning and general nutritional counseling with regards to diabetes management. Typical food intake obtained from patient. Patient currently is not measuring foods or carb counting.  She is working with fertility specialist at this time.  We focused on keeping glucose in range for preconception counseling.  Tends to eat out frequently.  We focused on making healthy food choices when eating out. Encouraged to stay with the above recommendations for carb  selections for each meal.     We concentrated on portion sizes at today's session.  Encouraged increased consumption of non-starchy veggies to diet.  Overall meal planning and making better choices for meals. Encouraged to avoid sources of sugar in diet.  Written education information provided to patient for use at home.          Task: Reviewed the sources and role of Carbohydrate, Protein, and Fat and how each nutrient impacts blood sugar. Completed 6/27/2024        Task: Provided visual examples using dry measuring cups, food models, and other familiar objects such as computer mouse, deck or cards, tennis ball etc. to help with visualization of portions. Completed 6/27/2024        Task: Explained how to count carbohydrates using the food label and the use of dry measuring cups for accurate carb counting. Completed 6/27/2024        Task: Discussed strategies for choosing healthier menu options when dining out. Completed 6/27/2024        Task: Recommended replacing beverages containing high sugar content with noncaloric/sugar free options and/or water. Completed 6/27/2024        Task: Review the importance of balancing carbohydrates with each meal using portion control techniques to count servings of carbohydrate and label reading to identify serving size and amount of total carbs per serving. Completed 6/27/2024        Task: Provided Sample plate method and reviewed the use of the plate to estimate amounts of carbohydrate per meal. Completed 6/27/2024          Follow Up Plan     No follow-ups on file.    Today's care plan and follow up schedule was discussed with patient.  Kayla verbalized understanding of the care plan, goals, and agrees to follow up plan.        The patient was encouraged to communicate with his/her health care provider/physician and care team regarding his/her condition(s) and treatment.  I provided the patient with my contact information today and encouraged to contact me via phone or  [facility] as needed.     Length of Visit   Total Time: 50 Minutes

## 2024-07-12 ENCOUNTER — PATIENT MESSAGE (OUTPATIENT)
Dept: ENDOCRINOLOGY | Facility: CLINIC | Age: 39
End: 2024-07-12
Payer: COMMERCIAL

## 2024-08-01 ENCOUNTER — PATIENT MESSAGE (OUTPATIENT)
Dept: ENDOCRINOLOGY | Facility: CLINIC | Age: 39
End: 2024-08-01
Payer: COMMERCIAL

## 2024-08-10 NOTE — PROGRESS NOTES
ENDOCRINOLOGY RETURN PATIENT VISIT: 08/12/2024    The patient location is: Home in [place]  The chief complaint leading to consultation is: Diabetes and Thyroid    Visit type: audiovisual    Face to Face time with patient: 25 minutes  45 minutes of total time spent on the encounter, which includes face to face time and non-face to face time preparing to see the patient (eg, review of tests), Obtaining and/or reviewing separately obtained history, Documenting clinical information in the electronic or other health record, Independently interpreting results (not separately reported) and communicating results to the patient/family/caregiver, or Care coordination (not separately reported).     Each patient to whom he or she provides medical services by telemedicine is:  (1) informed of the relationship between the physician and patient and the respective role of any other health care provider with respect to management of the patient; and (2) notified that he or she may decline to receive medical services by telemedicine and may withdraw from such care at any time.    Subjective:      Patient ID: Kayla Aguilar is a 38 y.o. female.    Chief Complaint:  Diabetes and Thyroid    History of Present Illness  Kayla Aguilar has a medical history of type 2 diabetes, Charcot foot, and current plans for pregnancy.  She is following with a fertility clinic and also maternal fetal medicine given her age and plans for pregnancy with underlying diabetes.      Originally referred by reproductive endocrinology (Dr. Antione Childs) due to plans for attempts at pregnancy and a history of diabetes.        Interval History:  BG has been variable due to recurrent UTI's    Now has her third UTI.  Symptoms include increased frequency and some pelvic pain.  She was placed on Nitrofurantoin prior now planning urgent care visit for new infection.  Early September she has a visit with urology to look into this further.      Was unable to go on  "IUI this past month due to the UTI issues.        Regarding diabetes:    Initially Diagnosed with T2D:  2015    Normal dexamethasone suppression test results performed with ACTH <5 and Cortisol of 0.97    Known diabetic complications: peripheral neuropathy, Charcot foot  Cardiovascular risk factors: diabetes mellitus, obesity (BMI >= 30 kg/m2), and sedentary lifestyle    Since last visit has had multiple UTIs which she feels affects her glucose    Current diabetic medications include:   Lantus 30 units daily  Novolog 8-12 units with meals, will add up to 5 units for scale as needed  Taking when she starts eating    Novolog correction based on before meal glucose:  130-180: add 1 unit  181-230: add 2 unit  231-280: add 3 units  281-330: add 4 units  331-380: add 5 units  >350: add 6 units    Other medications tried:  Ozempic in the past (off due to GI side effects and plans for pregnancy)  Trulicity   Xigduo (Metformin and dapagliflozin combo)  Metformin     Diet: on average, 2 meals per day      Exercise:  VR workouts , mobility limited but now able to ambulate with a boot    Glucose Trends: Dexcom in media - TIR 84%          Any episodes of hypoglycemia? As above    Family Hx:  Denies FH of diabetes or thyroid disorder             Wt Readings from Last 10 Encounters:   05/14/24 104.3 kg (230 lb)       Diabetes Management Status    Statin: Not taking  ACE/ARB: Not taking      Screening or Prevention Patient's value   HgA1C Testing and Control No results found for: "HGBA1C"   Lipid profile  Most Recent Lipid Panel Health Maintenance Topic Completion: Not Found   LDL control (goal LDL <70) No results found for: "LDLCALC"   Nephropathy screening No results found for: "LABMICR"  Lab Results   Component Value Date    PROTEINUA Negative 08/11/2022      Blood pressure BP Readings from Last 3 Encounters:   05/14/24 137/87      Dilated retinal exam Most Recent Eye Exam Date: Not Found  Had one in Feb 2024 - Outside [facility] " "  Foot exam Most Recent Foot Exam Date: Not Found  Follows with orthopedic doc for feet as well as podiatrist - Outside [facility]        Regarding Hypothyroidism:    Had TSH levels at 4.5 (no prior issues with thyroid function)    Started on LT4 in setting of planned pregnancy and TSH above 2.5, started around Dec 2023    - Current relevant medications: levothyroxine T4 (Synthroid) 50 mcg daily    - Takes thyroid medications properly  - Plans for pregnancy at this time: Yes, working with fertility clinic.    No results found for: "TSH", "FREET4", "TOTALT3", "THYROIDSTIMI", "THYROPEROXID", "NA", "GLU"  There were no vitals filed for this visit.       Older Labs:    11/27/2023 Labs (in media)  AMH:  1.31  A1c: 7.2 %  (also more recent one in pat 1-2 months of 7.1%)  Est Avg Glucose: 161  Prolactin: 10.4  TSH: 4.510  FSH: 8.12      Objective:     There were no vitals taken for this visit.  BP Readings from Last 3 Encounters:   05/14/24 137/87     Wt Readings from Last 1 Encounters:   05/14/24 1321 104.3 kg (230 lb)     There is no height or weight on file to calculate BMI.    Ht: 5'4"  Wt: 230  BMI: 39.5    Physical Exam  Constitutional:       Appearance: Normal appearance.   Neurological:      Mental Status: She is alert.   Psychiatric:         Mood and Affect: Mood normal.         Behavior: Behavior normal.           Lab Review:   No results found for: "HGBA1C"  No results found for: "CHOL", "HDL", "LDLCALC", "TRIG", "CHOLHDL"  No results found for: "NA", "K", "CL", "CO2", "GLU", "BUN", "CREATININE", "CALCIUM", "PROT", "ALBUMIN", "BILITOT", "ALKPHOS", "AST", "ALT", "ANIONGAP", "ESTGFRAFRICA", "EGFRNONAA", "TSH"  No results found for: "[id number]"    Assessment and Plan     Type 2 diabetes mellitus with hyperglycemia, with long-term current use of insulin  Dexcom reviewed and which has showed some recent worsening in post-prandial highs in setting of UTI  Overall time in range is 84% but when discussion for fertility " treatments resume the control should be even tighter    Has not been working out as much as prior due to now having her third UTI, soon will see urology  Is now on boot for her charcot foot and will attempt exercise soon when feeling better  Previous discussion about carb goals and education visit completed in late June 2024    Plan:  Lantus 18 units daily  Novolog 10 units with meals plus scale (130/50/1 unit)  Repeat labs soon with A1c and BMP, adding C-peptide    Up to date with retinal exam and foot exam through outside providers    Hypothyroidism  Prior TSH was at goal  Repeat TSH at this time  Refilling Levothyroxine 50 mcg    Obesity (BMI 30-39.9)  Unable to tolerate Metformin prior  Reviewed that GLP-1 RA not used in pregnancy or when planning pregnancy  May be able to attempt GLP-1 RA in future after pregnancy   Healthy diet and continued exercise encouraged    Charcot Chary Tooth muscular atrophy  Should continue to follow with podiatry and orthopedics  Good glucose control will be strived for    RTC in 3 months, labs soon    Alcides Estrella,

## 2024-08-12 ENCOUNTER — PATIENT MESSAGE (OUTPATIENT)
Dept: ENDOCRINOLOGY | Facility: CLINIC | Age: 39
End: 2024-08-12

## 2024-08-12 ENCOUNTER — OFFICE VISIT (OUTPATIENT)
Dept: ENDOCRINOLOGY | Facility: CLINIC | Age: 39
End: 2024-08-12
Payer: COMMERCIAL

## 2024-08-12 DIAGNOSIS — E66.9 OBESITY (BMI 30-39.9): ICD-10-CM

## 2024-08-12 DIAGNOSIS — E11.9 TYPE 2 DIABETES MELLITUS WITHOUT COMPLICATION, UNSPECIFIED WHETHER LONG TERM INSULIN USE: ICD-10-CM

## 2024-08-12 DIAGNOSIS — G60.0 CHARCOT MARIE TOOTH MUSCULAR ATROPHY: ICD-10-CM

## 2024-08-12 DIAGNOSIS — E03.9 HYPOTHYROIDISM, UNSPECIFIED TYPE: Primary | ICD-10-CM

## 2024-08-12 DIAGNOSIS — Z79.4 TYPE 2 DIABETES MELLITUS WITH HYPERGLYCEMIA, WITH LONG-TERM CURRENT USE OF INSULIN: ICD-10-CM

## 2024-08-12 DIAGNOSIS — E11.65 TYPE 2 DIABETES MELLITUS WITH HYPERGLYCEMIA, WITH LONG-TERM CURRENT USE OF INSULIN: ICD-10-CM

## 2024-08-12 RX ORDER — INSULIN GLARGINE 100 [IU]/ML
24 INJECTION, SOLUTION SUBCUTANEOUS DAILY
Qty: 3 EACH | Refills: 6 | Status: SHIPPED | OUTPATIENT
Start: 2024-08-12 | End: 2025-08-12

## 2024-08-12 RX ORDER — LEVOTHYROXINE SODIUM 50 UG/1
50 TABLET ORAL
Qty: 90 TABLET | Refills: 3 | Status: SHIPPED | OUTPATIENT
Start: 2024-08-12

## 2024-08-12 RX ORDER — FLUOXETINE HYDROCHLORIDE 40 MG/1
40 CAPSULE ORAL DAILY
COMMUNITY

## 2024-08-12 NOTE — PATIENT INSTRUCTIONS
As reviewed during our visit I would like you to continue to monitor your blood sugars closely with the use of the Dexcom while ill with a UTI.  While ill you may notice that you need more correction doses of Novolog which occurs due to a stress reaction and stress hormones (cortisol) causing blood sugars to spike.  Continue to follow closely with your PCP and be sure to keep that upcoming urology appointment so they can evaluate further on why you have had repeat urine infections.      I have sent updated Lantus dose to the pharmacy for 3 pens at a time.  The prescription will list it as 24 units a day but you may only end up needing 18-20 units a day as used prior.  So when you improve from current illness attempt to go back to regimen as listed below.  I have also included a separate sliding scale which is more aggressive when you are ill that can be used (double doses).    Continue with below suggested therapies:    Lantus 18 units once daily  Novolog 10 units with meals plus scale as needed    Novolog correction based on before meal glucose:    (can be used by itself at bedtime or overnight if blood sugars are high)  130-180: add 1 unit  181-230: add 2 unit  231-280: add 3 units  281-330: add 4 units  331-380: add 5 units  >350: add 6 units    Novolog correction scale when ill  150-200: add 2 units  201-250: add 4 units  251-300: add 6 units   301-350: add 8 units  >350: add 10 units    :::::::::::::::::::::::::    Carb Goals As Previously Discussed:    Try for 45-60 grams of carbs with each meal.  If some meals you prefer smaller amount of carbs (30-40g, then lower insulin doses will be needed)     Aim for ~175 grams of carbohydrate daily, divided into 3 regularly-spaced small-to-moderate sized meals      :::::::::::::::::::::::::    We can repeat some labs soon including an A1c, TSH and a metabolic panel.  We will be checking a c-peptide level which helps show us if your body is making any insulin on it's  own.  This can give us information of if you have type 1 (SEVERINO) vs type 2 diabetes.  If this is abnormal we can consider some antibody testing but keep in mind if you become diagnosed with type 1 diabetes or SEVERINO we sometime have issues with prescribing medications such as Ozempic or Mounjaro as these are only approved for Type 2 diabetics currently.    I will follow up when I have those labs.  Our staff will be reaching out to obtain information on which lab you will be using so they can be mailed/faxed as needed.  I also sent updated prescription for Levothyroxine and when that lab returns we can adjust the dose as needed.    If you have any major changes in your blood sugars such as any concerning lows or blood sugars that are staying consistently above 180-200, then please let us know so we can evaluate data sooner.  We will otherwise plan a repeat visit in 3 months with you, around early November as discussed.

## 2024-08-12 NOTE — ASSESSMENT & PLAN NOTE
Dexcom reviewed and which has showed some recent worsening in post-prandial highs in setting of UTI  Overall time in range is 84% but when discussion for fertility treatments resume the control should be even tighter    Has not been working out as much as prior due to now having her third UTI, soon will see urology  Is now on boot for her charcot foot and will attempt exercise soon when feeling better  Previous discussion about carb goals and education visit completed in late June 2024    Plan:  Lantus 18 units daily  Novolog 10 units with meals plus scale (130/50/1 unit)  Repeat labs soon with A1c and BMP, adding C-peptide    Up to date with retinal exam and foot exam through outside providers

## 2024-08-12 NOTE — Clinical Note
Follow up visit in first week of November virtually (week of Nov 4th)  Labs soon (external orders placed) - need to be set up at lab of her choosing near her.  Not an [facility] lab so might need to be faxed or mailed.    Thanks.

## 2024-08-12 NOTE — ASSESSMENT & PLAN NOTE
Unable to tolerate Metformin prior  Reviewed that GLP-1 RA not used in pregnancy or when planning pregnancy  May be able to attempt GLP-1 RA in future after pregnancy   Healthy diet and continued exercise encouraged

## 2024-08-29 ENCOUNTER — PATIENT MESSAGE (OUTPATIENT)
Facility: CLINIC | Age: 39
End: 2024-08-29
Payer: COMMERCIAL

## 2025-06-24 ENCOUNTER — TELEPHONE (OUTPATIENT)
Dept: PAIN MEDICINE | Facility: CLINIC | Age: 40
End: 2025-06-24
Payer: COMMERCIAL

## 2025-06-24 NOTE — TELEPHONE ENCOUNTER
Pt ci inquiring about pain management     Copied from CRM #[id number]. Topic: General Inquiry - Return Call  >> Jun 24, 2025  3:35 PM Michelle wrote:  Type:  Patient Requesting Call    Who Called:Kayla Aguilar  Does the patient know what this is regarding?:pt is calling to speak with nurse  Would the patient rather a call back or a response via [facility]? call  Best Call Back Number:.[phone]    Additional Information: